# Patient Record
Sex: FEMALE | Race: WHITE | Employment: OTHER | URBAN - METROPOLITAN AREA
[De-identification: names, ages, dates, MRNs, and addresses within clinical notes are randomized per-mention and may not be internally consistent; named-entity substitution may affect disease eponyms.]

---

## 2021-07-02 DIAGNOSIS — E03.9 ACQUIRED HYPOTHYROIDISM: ICD-10-CM

## 2021-07-02 DIAGNOSIS — Z13.220 LIPID SCREENING: ICD-10-CM

## 2021-07-02 LAB
A/G RATIO: 1.8 (ref 1.1–2.2)
ALBUMIN SERPL-MCNC: 4.6 G/DL (ref 3.4–5)
ALP BLD-CCNC: 111 U/L (ref 40–129)
ALT SERPL-CCNC: <5 U/L (ref 10–40)
ANION GAP SERPL CALCULATED.3IONS-SCNC: 12 MMOL/L (ref 3–16)
AST SERPL-CCNC: 14 U/L (ref 15–37)
BASOPHILS ABSOLUTE: 0 K/UL (ref 0–0.2)
BASOPHILS RELATIVE PERCENT: 0.5 %
BILIRUB SERPL-MCNC: 0.5 MG/DL (ref 0–1)
BUN BLDV-MCNC: 15 MG/DL (ref 7–20)
CALCIUM SERPL-MCNC: 9.3 MG/DL (ref 8.3–10.6)
CHLORIDE BLD-SCNC: 108 MMOL/L (ref 99–110)
CHOLESTEROL, TOTAL: 165 MG/DL (ref 0–199)
CO2: 22 MMOL/L (ref 21–32)
CREAT SERPL-MCNC: 0.8 MG/DL (ref 0.6–1.1)
EOSINOPHILS ABSOLUTE: 0.1 K/UL (ref 0–0.6)
EOSINOPHILS RELATIVE PERCENT: 1.3 %
GFR AFRICAN AMERICAN: >60
GFR NON-AFRICAN AMERICAN: >60
GLOBULIN: 2.6 G/DL
GLUCOSE FASTING: 98 MG/DL (ref 70–99)
HCT VFR BLD CALC: 40.9 % (ref 36–48)
HDLC SERPL-MCNC: 64 MG/DL (ref 40–60)
HEMOGLOBIN: 13.9 G/DL (ref 12–16)
LDL CHOLESTEROL CALCULATED: 83 MG/DL
LYMPHOCYTES ABSOLUTE: 1.9 K/UL (ref 1–5.1)
LYMPHOCYTES RELATIVE PERCENT: 26 %
MCH RBC QN AUTO: 31.7 PG (ref 26–34)
MCHC RBC AUTO-ENTMCNC: 34 G/DL (ref 31–36)
MCV RBC AUTO: 93 FL (ref 80–100)
MONOCYTES ABSOLUTE: 0.4 K/UL (ref 0–1.3)
MONOCYTES RELATIVE PERCENT: 5.2 %
NEUTROPHILS ABSOLUTE: 5 K/UL (ref 1.7–7.7)
NEUTROPHILS RELATIVE PERCENT: 67 %
PDW BLD-RTO: 12.9 % (ref 12.4–15.4)
PLATELET # BLD: 247 K/UL (ref 135–450)
PMV BLD AUTO: 8.5 FL (ref 5–10.5)
POTASSIUM SERPL-SCNC: 4.3 MMOL/L (ref 3.5–5.1)
RBC # BLD: 4.4 M/UL (ref 4–5.2)
SODIUM BLD-SCNC: 142 MMOL/L (ref 136–145)
T4 FREE: 1.3 NG/DL (ref 0.9–1.8)
TOTAL PROTEIN: 7.2 G/DL (ref 6.4–8.2)
TRIGL SERPL-MCNC: 89 MG/DL (ref 0–150)
TSH SERPL DL<=0.05 MIU/L-ACNC: 3.47 UIU/ML (ref 0.27–4.2)
VLDLC SERPL CALC-MCNC: 18 MG/DL
WBC # BLD: 7.5 K/UL (ref 4–11)

## 2021-07-03 LAB
FOLATE: 8.72 NG/ML (ref 4.78–24.2)
VITAMIN B-12: 427 PG/ML (ref 211–911)

## 2022-11-12 PROBLEM — Z71.2 ENCOUNTER TO DISCUSS COLONOSCOPY RESULTS: Status: ACTIVE | Noted: 2022-11-12

## 2023-05-25 ENCOUNTER — OFFICE VISIT (OUTPATIENT)
Dept: SURGERY | Age: 48
End: 2023-05-25
Payer: COMMERCIAL

## 2023-05-25 VITALS
WEIGHT: 162 LBS | BODY MASS INDEX: 26.03 KG/M2 | HEIGHT: 66 IN | DIASTOLIC BLOOD PRESSURE: 76 MMHG | SYSTOLIC BLOOD PRESSURE: 118 MMHG

## 2023-05-25 DIAGNOSIS — S72.309D CLOSED FRACTURE OF SHAFT OF FEMUR WITH ROUTINE HEALING, UNSPECIFIED FRACTURE MORPHOLOGY, UNSPECIFIED LATERALITY, SUBSEQUENT ENCOUNTER: Primary | ICD-10-CM

## 2023-05-25 PROCEDURE — 99204 OFFICE O/P NEW MOD 45 MIN: CPT | Performed by: SURGERY

## 2023-05-25 PROCEDURE — G8427 DOCREV CUR MEDS BY ELIG CLIN: HCPCS | Performed by: SURGERY

## 2023-05-25 PROCEDURE — 1036F TOBACCO NON-USER: CPT | Performed by: SURGERY

## 2023-05-25 PROCEDURE — G8419 CALC BMI OUT NRM PARAM NOF/U: HCPCS | Performed by: SURGERY

## 2023-05-25 PROCEDURE — 3017F COLORECTAL CA SCREEN DOC REV: CPT | Performed by: SURGERY

## 2023-05-26 PROBLEM — L98.492 TRAUMATIC SKIN ULCER, WITH FAT LAYER EXPOSED (HCC): Status: ACTIVE | Noted: 2023-05-26

## 2023-05-26 PROBLEM — T81.89XA NONHEALING SURGICAL WOUND, INITIAL ENCOUNTER: Status: ACTIVE | Noted: 2023-05-26

## 2023-05-26 PROBLEM — L97.122 SKIN ULCER OF LEFT HIP WITH FAT LAYER EXPOSED (HCC): Status: ACTIVE | Noted: 2023-05-26

## 2023-05-26 PROBLEM — L97.322 ANKLE ULCER, LEFT, WITH FAT LAYER EXPOSED (HCC): Status: ACTIVE | Noted: 2023-05-26

## 2023-05-26 PROBLEM — Z93.3 COLOSTOMY PRESENT (HCC): Status: ACTIVE | Noted: 2023-05-26

## 2023-05-26 PROBLEM — L98.492 TRAUMATIC SKIN ULCER, WITH FAT LAYER EXPOSED (HCC): Status: RESOLVED | Noted: 2023-05-26 | Resolved: 2023-05-26

## 2023-05-26 PROBLEM — L98.492 ULCER OF ABDOMEN WALL WITH FAT LAYER EXPOSED (HCC): Status: ACTIVE | Noted: 2023-05-26

## 2023-05-26 PROBLEM — L97.822 SKIN ULCER OF LEFT KNEE WITH FAT LAYER EXPOSED (HCC): Status: ACTIVE | Noted: 2023-05-26

## 2023-05-26 ASSESSMENT — ENCOUNTER SYMPTOMS
ABDOMINAL PAIN: 1
BACK PAIN: 0
EYE ITCHING: 0
CONSTIPATION: 0
EYE REDNESS: 0
APNEA: 0
COLOR CHANGE: 0
RECTAL PAIN: 0
ANAL BLEEDING: 0
STRIDOR: 0
SORE THROAT: 0
PHOTOPHOBIA: 0
CHOKING: 0

## 2023-06-04 PROBLEM — L02.91 ABSCESS: Status: ACTIVE | Noted: 2023-06-04

## 2023-06-04 PROBLEM — K65.1 INTRA-ABDOMINAL ABSCESS (HCC): Status: ACTIVE | Noted: 2023-06-04

## 2023-06-15 ENCOUNTER — TELEPHONE (OUTPATIENT)
Dept: WOUND CARE | Age: 48
End: 2023-06-15

## 2023-06-26 ENCOUNTER — OFFICE VISIT (OUTPATIENT)
Dept: SURGERY | Age: 48
End: 2023-06-26

## 2023-06-26 ENCOUNTER — OFFICE VISIT (OUTPATIENT)
Dept: ORTHOPEDIC SURGERY | Age: 48
End: 2023-06-26
Payer: COMMERCIAL

## 2023-06-26 VITALS
DIASTOLIC BLOOD PRESSURE: 72 MMHG | BODY MASS INDEX: 22.6 KG/M2 | HEART RATE: 110 BPM | OXYGEN SATURATION: 95 % | WEIGHT: 140 LBS | SYSTOLIC BLOOD PRESSURE: 128 MMHG

## 2023-06-26 VITALS — OXYGEN SATURATION: 97 % | HEART RATE: 139 BPM | RESPIRATION RATE: 14 BRPM

## 2023-06-26 DIAGNOSIS — L02.91 ABSCESS: Primary | ICD-10-CM

## 2023-06-26 DIAGNOSIS — E03.9 ACQUIRED HYPOTHYROIDISM: ICD-10-CM

## 2023-06-26 DIAGNOSIS — Z87.81 S/P ORIF (OPEN REDUCTION INTERNAL FIXATION) FRACTURE: ICD-10-CM

## 2023-06-26 DIAGNOSIS — S72.432A CLOSED BICONDYLAR FRACTURE OF LEFT FEMUR, INITIAL ENCOUNTER (HCC): ICD-10-CM

## 2023-06-26 DIAGNOSIS — S72.422A CLOSED BICONDYLAR FRACTURE OF LEFT FEMUR, INITIAL ENCOUNTER (HCC): ICD-10-CM

## 2023-06-26 DIAGNOSIS — G43.009 NONINTRACTABLE MIGRAINE, UNSPECIFIED MIGRAINE TYPE: ICD-10-CM

## 2023-06-26 DIAGNOSIS — J44.9 CHRONIC OBSTRUCTIVE PULMONARY DISEASE, UNSPECIFIED COPD TYPE (HCC): ICD-10-CM

## 2023-06-26 DIAGNOSIS — S32.302A CLOSED FRACTURE OF LEFT ILIAC WING, INITIAL ENCOUNTER (HCC): Primary | ICD-10-CM

## 2023-06-26 DIAGNOSIS — Z98.890 S/P ORIF (OPEN REDUCTION INTERNAL FIXATION) FRACTURE: ICD-10-CM

## 2023-06-26 LAB
ALBUMIN SERPL-MCNC: 4.1 G/DL
ALP BLD-CCNC: 321 U/L
ALT SERPL-CCNC: 7 U/L
ANION GAP SERPL CALCULATED.3IONS-SCNC: 1.4 MMOL/L
AST SERPL-CCNC: 19 U/L
BASOPHILS ABSOLUTE: 0 /ΜL
BASOPHILS RELATIVE PERCENT: 0.4 %
BILIRUB SERPL-MCNC: 0.3 MG/DL (ref 0.1–1.4)
BUN BLDV-MCNC: 6 MG/DL
C-REACTIVE PROTEIN: 1.35
CALCIUM SERPL-MCNC: 10.3 MG/DL
CHLORIDE BLD-SCNC: 104 MMOL/L
CO2: 25 MMOL/L
CREAT SERPL-MCNC: 0.7 MG/DL
EGFR: 105
EOSINOPHILS ABSOLUTE: 0.3 /ΜL
EOSINOPHILS RELATIVE PERCENT: 4.6 %
GLUCOSE BLD-MCNC: 98 MG/DL
HCT VFR BLD CALC: 37.6 % (ref 36–46)
HEMOGLOBIN: 12.4 G/DL (ref 12–16)
LYMPHOCYTES ABSOLUTE: 2.1 /ΜL
LYMPHOCYTES RELATIVE PERCENT: 30.7 %
MCH RBC QN AUTO: 29.2 PG
MCHC RBC AUTO-ENTMCNC: 33 G/DL
MCV RBC AUTO: 88.7 FL
MONOCYTES ABSOLUTE: 0.4 /ΜL
MONOCYTES RELATIVE PERCENT: 5.3 %
NEUTROPHILS ABSOLUTE: 4.1 /ΜL
NEUTROPHILS RELATIVE PERCENT: 58.9 %
PDW BLD-RTO: 13.6 %
PLATELET # BLD: 304 K/ΜL
PMV BLD AUTO: 9.7 FL
POTASSIUM SERPL-SCNC: 4.1 MMOL/L
RBC # BLD: 4.24 10^6/ΜL
SEDIMENTATION RATE, ERYTHROCYTE: 56
SODIUM BLD-SCNC: 143 MMOL/L
TOTAL PROTEIN: 7
WBC # BLD: 6.9 10^3/ML

## 2023-06-26 PROCEDURE — 3017F COLORECTAL CA SCREEN DOC REV: CPT | Performed by: PHYSICIAN ASSISTANT

## 2023-06-26 PROCEDURE — 1111F DSCHRG MED/CURRENT MED MERGE: CPT | Performed by: PHYSICIAN ASSISTANT

## 2023-06-26 PROCEDURE — G8420 CALC BMI NORM PARAMETERS: HCPCS | Performed by: PHYSICIAN ASSISTANT

## 2023-06-26 PROCEDURE — 1036F TOBACCO NON-USER: CPT | Performed by: PHYSICIAN ASSISTANT

## 2023-06-26 PROCEDURE — G8427 DOCREV CUR MEDS BY ELIG CLIN: HCPCS | Performed by: PHYSICIAN ASSISTANT

## 2023-06-26 PROCEDURE — 99203 OFFICE O/P NEW LOW 30 MIN: CPT | Performed by: PHYSICIAN ASSISTANT

## 2023-06-26 RX ORDER — ALBUTEROL SULFATE 90 UG/1
1 AEROSOL, METERED RESPIRATORY (INHALATION) EVERY 6 HOURS PRN
Qty: 3 EACH | Refills: 0 | Status: SHIPPED | OUTPATIENT
Start: 2023-06-26

## 2023-06-26 RX ORDER — GABAPENTIN 600 MG/1
600 TABLET ORAL 3 TIMES DAILY
COMMUNITY
End: 2023-06-28 | Stop reason: SDUPTHER

## 2023-06-26 RX ORDER — MIDODRINE HYDROCHLORIDE 5 MG/1
5 TABLET ORAL 3 TIMES DAILY
Qty: 270 TABLET | Refills: 1 | Status: SHIPPED | OUTPATIENT
Start: 2023-06-26

## 2023-06-26 RX ORDER — ASPIRIN 81 MG/1
TABLET, CHEWABLE ORAL
Qty: 90 TABLET | Refills: 0 | Status: SHIPPED | OUTPATIENT
Start: 2023-06-26

## 2023-06-26 RX ORDER — FAMOTIDINE 20 MG/1
TABLET, FILM COATED ORAL
Qty: 180 TABLET | Refills: 0 | Status: SHIPPED | OUTPATIENT
Start: 2023-06-26

## 2023-06-26 RX ORDER — MECLIZINE HYDROCHLORIDE 25 MG/1
25 TABLET ORAL DAILY PRN
Qty: 90 TABLET | Refills: 0 | Status: SHIPPED | OUTPATIENT
Start: 2023-06-26

## 2023-06-26 RX ORDER — GABAPENTIN 100 MG/1
200 CAPSULE ORAL 4 TIMES DAILY
Qty: 720 CAPSULE | Refills: 0 | Status: SHIPPED | OUTPATIENT
Start: 2023-06-26 | End: 2023-06-28

## 2023-06-26 RX ORDER — OXYCODONE HYDROCHLORIDE AND ACETAMINOPHEN 10; 300 MG/5ML; MG/5ML
SOLUTION ORAL
COMMUNITY
Start: 2023-06-11

## 2023-06-26 RX ORDER — TOPIRAMATE 25 MG/1
75 TABLET ORAL NIGHTLY
Qty: 270 TABLET | Refills: 0 | Status: SHIPPED | OUTPATIENT
Start: 2023-06-26 | End: 2023-09-24

## 2023-06-26 RX ORDER — BUDESONIDE AND FORMOTEROL FUMARATE DIHYDRATE 160; 4.5 UG/1; UG/1
AEROSOL RESPIRATORY (INHALATION)
Qty: 3 EACH | Refills: 0 | Status: SHIPPED | OUTPATIENT
Start: 2023-06-26

## 2023-06-26 RX ORDER — LEVOTHYROXINE SODIUM 0.1 MG/1
100 TABLET ORAL DAILY
Qty: 90 TABLET | Refills: 0 | Status: SHIPPED | OUTPATIENT
Start: 2023-06-26

## 2023-06-26 ASSESSMENT — PATIENT HEALTH QUESTIONNAIRE - PHQ9
10. IF YOU CHECKED OFF ANY PROBLEMS, HOW DIFFICULT HAVE THESE PROBLEMS MADE IT FOR YOU TO DO YOUR WORK, TAKE CARE OF THINGS AT HOME, OR GET ALONG WITH OTHER PEOPLE: 1
4. FEELING TIRED OR HAVING LITTLE ENERGY: 2
SUM OF ALL RESPONSES TO PHQ9 QUESTIONS 1 & 2: 4
SUM OF ALL RESPONSES TO PHQ QUESTIONS 1-9: 12
5. POOR APPETITE OR OVEREATING: 0
SUM OF ALL RESPONSES TO PHQ QUESTIONS 1-9: 12
SUM OF ALL RESPONSES TO PHQ QUESTIONS 1-9: 12
9. THOUGHTS THAT YOU WOULD BE BETTER OFF DEAD, OR OF HURTING YOURSELF: 0
7. TROUBLE CONCENTRATING ON THINGS, SUCH AS READING THE NEWSPAPER OR WATCHING TELEVISION: 2
3. TROUBLE FALLING OR STAYING ASLEEP: 2
6. FEELING BAD ABOUT YOURSELF - OR THAT YOU ARE A FAILURE OR HAVE LET YOURSELF OR YOUR FAMILY DOWN: 2
SUM OF ALL RESPONSES TO PHQ QUESTIONS 1-9: 12
8. MOVING OR SPEAKING SO SLOWLY THAT OTHER PEOPLE COULD HAVE NOTICED. OR THE OPPOSITE, BEING SO FIGETY OR RESTLESS THAT YOU HAVE BEEN MOVING AROUND A LOT MORE THAN USUAL: 0
1. LITTLE INTEREST OR PLEASURE IN DOING THINGS: 2
2. FEELING DOWN, DEPRESSED OR HOPELESS: 2

## 2023-06-28 ENCOUNTER — TELEPHONE (OUTPATIENT)
Dept: BARIATRICS/WEIGHT MGMT | Age: 48
End: 2023-06-28

## 2023-06-28 RX ORDER — GABAPENTIN 600 MG/1
600 TABLET ORAL NIGHTLY
Qty: 90 TABLET | Refills: 0 | Status: SHIPPED | OUTPATIENT
Start: 2023-06-28 | End: 2023-09-26

## 2023-06-29 ENCOUNTER — HOSPITAL ENCOUNTER (OUTPATIENT)
Dept: WOUND CARE | Age: 48
Discharge: HOME | End: 2023-06-29
Payer: COMMERCIAL

## 2023-06-29 VITALS
DIASTOLIC BLOOD PRESSURE: 59 MMHG | HEART RATE: 80 BPM | RESPIRATION RATE: 18 BRPM | TEMPERATURE: 97.9 F | SYSTOLIC BLOOD PRESSURE: 106 MMHG

## 2023-06-29 DIAGNOSIS — T81.89XA NONHEALING SURGICAL WOUND, INITIAL ENCOUNTER: Primary | ICD-10-CM

## 2023-06-29 DIAGNOSIS — L97.322 ANKLE ULCER, LEFT, WITH FAT LAYER EXPOSED (HCC): ICD-10-CM

## 2023-06-29 DIAGNOSIS — L97.112 THIGH ULCER, RIGHT, WITH FAT LAYER EXPOSED (HCC): ICD-10-CM

## 2023-06-29 DIAGNOSIS — L97.822 SKIN ULCER OF LEFT KNEE WITH FAT LAYER EXPOSED (HCC): ICD-10-CM

## 2023-06-29 DIAGNOSIS — L97.122 SKIN ULCER OF LEFT HIP WITH FAT LAYER EXPOSED (HCC): ICD-10-CM

## 2023-06-29 DIAGNOSIS — L98.492 TRAUMATIC ULCER WITH FAT LAYER EXPOSED (HCC): ICD-10-CM

## 2023-06-29 DIAGNOSIS — L98.492 SKIN ULCER OF ABDOMEN WITH FAT LAYER EXPOSED (HCC): ICD-10-CM

## 2023-06-29 DIAGNOSIS — Z93.3 COLOSTOMY IN PLACE (HCC): ICD-10-CM

## 2023-06-29 DIAGNOSIS — L97.812 SKIN ULCER OF RIGHT KNEE WITH FAT LAYER EXPOSED (HCC): ICD-10-CM

## 2023-06-29 PROCEDURE — 99213 OFFICE O/P EST LOW 20 MIN: CPT | Performed by: NURSE PRACTITIONER

## 2023-06-29 PROCEDURE — 99212 OFFICE O/P EST SF 10 MIN: CPT

## 2023-06-29 RX ORDER — IBUPROFEN 200 MG
TABLET ORAL ONCE
Status: CANCELLED | OUTPATIENT
Start: 2023-06-29 | End: 2023-06-29

## 2023-06-29 RX ORDER — BACITRACIN ZINC AND POLYMYXIN B SULFATE 500; 1000 [USP'U]/G; [USP'U]/G
OINTMENT TOPICAL ONCE
Status: CANCELLED | OUTPATIENT
Start: 2023-06-29 | End: 2023-06-29

## 2023-06-29 RX ORDER — SODIUM CHLOR/HYPOCHLOROUS ACID 0.033 %
SOLUTION, IRRIGATION IRRIGATION ONCE
Status: CANCELLED | OUTPATIENT
Start: 2023-06-29 | End: 2023-06-29

## 2023-06-29 RX ORDER — BETAMETHASONE DIPROPIONATE 0.05 %
OINTMENT (GRAM) TOPICAL ONCE
Status: CANCELLED | OUTPATIENT
Start: 2023-06-29 | End: 2023-06-29

## 2023-06-29 RX ORDER — LIDOCAINE 40 MG/G
CREAM TOPICAL ONCE
Status: CANCELLED | OUTPATIENT
Start: 2023-06-29 | End: 2023-06-29

## 2023-06-29 RX ORDER — LIDOCAINE 50 MG/G
OINTMENT TOPICAL ONCE
Status: CANCELLED | OUTPATIENT
Start: 2023-06-29 | End: 2023-06-29

## 2023-06-29 RX ORDER — LIDOCAINE HYDROCHLORIDE 40 MG/ML
SOLUTION TOPICAL ONCE
Status: CANCELLED | OUTPATIENT
Start: 2023-06-29 | End: 2023-06-29

## 2023-06-29 RX ORDER — GINSENG 100 MG
CAPSULE ORAL ONCE
Status: CANCELLED | OUTPATIENT
Start: 2023-06-29 | End: 2023-06-29

## 2023-06-29 RX ORDER — GENTAMICIN SULFATE 1 MG/G
OINTMENT TOPICAL ONCE
Status: CANCELLED | OUTPATIENT
Start: 2023-06-29 | End: 2023-06-29

## 2023-06-29 RX ORDER — CLOBETASOL PROPIONATE 0.5 MG/G
OINTMENT TOPICAL ONCE
Status: CANCELLED | OUTPATIENT
Start: 2023-06-29 | End: 2023-06-29

## 2023-06-29 ASSESSMENT — PAIN DESCRIPTION - ORIENTATION: ORIENTATION: RIGHT

## 2023-06-29 ASSESSMENT — PAIN DESCRIPTION - DESCRIPTORS: DESCRIPTORS: ACHING

## 2023-06-29 ASSESSMENT — PAIN DESCRIPTION - LOCATION: LOCATION: ANKLE

## 2023-06-29 ASSESSMENT — PAIN SCALES - GENERAL: PAINLEVEL_OUTOF10: 7

## 2023-06-30 RX ORDER — TIZANIDINE 4 MG/1
TABLET ORAL
Qty: 90 TABLET | Refills: 0 | Status: SHIPPED | OUTPATIENT
Start: 2023-06-30

## 2023-07-02 ASSESSMENT — ENCOUNTER SYMPTOMS
APNEA: 0
EYE ITCHING: 0
STRIDOR: 0
CONSTIPATION: 0
BACK PAIN: 0
RECTAL PAIN: 0
CHOKING: 0
PHOTOPHOBIA: 0
ANAL BLEEDING: 0
EYE REDNESS: 0
SORE THROAT: 0
COLOR CHANGE: 0

## 2023-07-03 ENCOUNTER — TELEPHONE (OUTPATIENT)
Dept: INFECTIOUS DISEASES | Age: 48
End: 2023-07-03

## 2023-07-03 ENCOUNTER — OFFICE VISIT (OUTPATIENT)
Dept: SURGERY | Age: 48
End: 2023-07-03
Payer: COMMERCIAL

## 2023-07-03 VITALS
BODY MASS INDEX: 22.6 KG/M2 | DIASTOLIC BLOOD PRESSURE: 64 MMHG | HEIGHT: 66 IN | HEART RATE: 76 BPM | SYSTOLIC BLOOD PRESSURE: 116 MMHG

## 2023-07-03 DIAGNOSIS — L02.91 ABSCESS: Primary | ICD-10-CM

## 2023-07-03 LAB
ALBUMIN SERPL-MCNC: 4.2 G/DL
ALP BLD-CCNC: 290 U/L
ALT SERPL-CCNC: 7 U/L
ANION GAP SERPL CALCULATED.3IONS-SCNC: 1.4 MMOL/L
AST SERPL-CCNC: 29 U/L
BASOPHILS ABSOLUTE: 0.1 /ΜL
BASOPHILS RELATIVE PERCENT: 0.6 %
BILIRUB SERPL-MCNC: 0.3 MG/DL (ref 0.1–1.4)
BUN BLDV-MCNC: 10 MG/DL
C-REACTIVE PROTEIN: 1.05
CALCIUM SERPL-MCNC: 10.4 MG/DL
CHLORIDE BLD-SCNC: 105 MMOL/L
CO2: 25 MMOL/L
CREAT SERPL-MCNC: 0.9 MG/DL
EGFR: 78
EOSINOPHILS ABSOLUTE: 0.4 /ΜL
EOSINOPHILS RELATIVE PERCENT: 5.1 %
GLUCOSE BLD-MCNC: 83 MG/DL
HCT VFR BLD CALC: 36.7 % (ref 36–46)
HEMOGLOBIN: 12.7 G/DL (ref 12–16)
LYMPHOCYTES ABSOLUTE: 2.1 /ΜL
LYMPHOCYTES RELATIVE PERCENT: 27.4 %
MCH RBC QN AUTO: 30 PG
MCHC RBC AUTO-ENTMCNC: 34.6 G/DL
MCV RBC AUTO: 86.8 FL
MONOCYTES ABSOLUTE: 0.4 /ΜL
MONOCYTES RELATIVE PERCENT: 5.4 %
NEUTROPHILS ABSOLUTE: 4.8 /ΜL
NEUTROPHILS RELATIVE PERCENT: 61.4 %
PDW BLD-RTO: 13.7 %
PLATELET # BLD: 323 K/ΜL
PMV BLD AUTO: 9.8 FL
POTASSIUM SERPL-SCNC: 4.3 MMOL/L
RBC # BLD: 4.23 10^6/ΜL
SEDIMENTATION RATE, ERYTHROCYTE: 34
SODIUM BLD-SCNC: 143 MMOL/L
TOTAL PROTEIN: 7.1
WBC # BLD: 7.8 10^3/ML

## 2023-07-03 PROCEDURE — 1036F TOBACCO NON-USER: CPT | Performed by: PHYSICIAN ASSISTANT

## 2023-07-03 PROCEDURE — G8420 CALC BMI NORM PARAMETERS: HCPCS | Performed by: PHYSICIAN ASSISTANT

## 2023-07-03 PROCEDURE — 3017F COLORECTAL CA SCREEN DOC REV: CPT | Performed by: PHYSICIAN ASSISTANT

## 2023-07-03 PROCEDURE — G8427 DOCREV CUR MEDS BY ELIG CLIN: HCPCS | Performed by: PHYSICIAN ASSISTANT

## 2023-07-03 PROCEDURE — 1111F DSCHRG MED/CURRENT MED MERGE: CPT | Performed by: PHYSICIAN ASSISTANT

## 2023-07-03 PROCEDURE — 99213 OFFICE O/P EST LOW 20 MIN: CPT | Performed by: PHYSICIAN ASSISTANT

## 2023-07-03 NOTE — TELEPHONE ENCOUNTER
Messaged from Mima Ayala in [facility] states pt is out of their ABX on 7/6/23. Is it ok for them to extend them out till we can see the pt? They just put the referral in today 7/3/23. Please advise.

## 2023-07-05 ENCOUNTER — TELEPHONE (OUTPATIENT)
Dept: INFECTIOUS DISEASES | Age: 48
End: 2023-07-05

## 2023-07-05 ENCOUNTER — TELEPHONE (OUTPATIENT)
Dept: SURGERY | Age: 48
End: 2023-07-05

## 2023-07-05 ASSESSMENT — ENCOUNTER SYMPTOMS
BACK PAIN: 0
CHOKING: 0
PHOTOPHOBIA: 0
EYE REDNESS: 0
COLOR CHANGE: 0
NAUSEA: 0
CONSTIPATION: 0
STRIDOR: 0
ANAL BLEEDING: 0
ABDOMINAL PAIN: 1
APNEA: 0
VOMITING: 0
RECTAL PAIN: 0
EYE ITCHING: 0
SORE THROAT: 0

## 2023-07-05 NOTE — TELEPHONE ENCOUNTER
PT called regarding APPT with Dr. Elliott Harrison , spoke with office they will call after 12:00 once he reviews her records. Informed leana of this information.

## 2023-07-05 NOTE — TELEPHONE ENCOUNTER
Spoke with Maykel Armendariz at Dr. Lolly Wilson, they have patient scheduled for 7/12/23, extened her ATB, scheduled lab's , called home health regarding orders and spoke with patient regarding her instructions

## 2023-07-05 NOTE — PROGRESS NOTES
Insecurity: No Food Insecurity    Worried About Running Out of Food in the Last Year: Never true    Ran Out of Food in the Last Year: Never true   Transportation Needs: Unknown    Lack of Transportation (Medical): Not on file    Lack of Transportation (Non-Medical): No   Physical Activity: Sufficiently Active    Days of Exercise per Week: 7 days    Minutes of Exercise per Session: 50 min   Stress: Not on file   Social Connections: Not on file   Intimate Partner Violence: Not on file   Housing Stability: Unknown    Unable to Pay for Housing in the Last Year: Not on file    Number of Places Lived in the Last Year: Not on file    Unstable Housing in the Last Year: No       Current Outpatient Medications   Medication Sig Dispense Refill    tiZANidine (ZANAFLEX) 4 MG tablet TAKE 1 TABLET BY MOUTH EVERY NIGHT 90 tablet 0    gabapentin (NEURONTIN) 600 MG tablet Take 1 tablet by mouth at bedtime for 90 days.  90 tablet 0    levothyroxine (SYNTHROID) 100 MCG tablet Take 1 tablet by mouth Daily 90 tablet 0    budesonide-formoterol (SYMBICORT) 160-4.5 MCG/ACT AERO INHALE 2 PUFFS INTO THE LUNGS DAILY 3 each 0    tiotropium (SPIRIVA RESPIMAT) 2.5 MCG/ACT AERS inhaler Inhale 2 puffs into the lungs daily 3 each 0    topiramate (TOPAMAX) 25 MG tablet Take 3 tablets by mouth nightly 270 tablet 0    albuterol sulfate HFA (PROAIR HFA) 108 (90 Base) MCG/ACT inhaler Inhale 1 puff into the lungs every 6 hours as needed for Wheezing 3 each 0    famotidine (PEPCID) 20 MG tablet TAKE 1 TABLET BY MOUTH TWICE DAILY 180 tablet 0    meclizine (ANTIVERT) 25 MG tablet Take 1 tablet by mouth daily as needed for Dizziness 90 tablet 0    aspirin 81 MG chewable tablet CHEW AND SWALLOW 1 TABLET BY MOUTH DAILY 90 tablet 0    Acetaminophen (TYLENOL 8 HOUR PO) 2 tablet EVERY 6 HOURS (route: oral)      Heparin Sod, Pork, Lock Flush (HEPARIN LOCK FLUSH IV) 3 mL DAILY (route: intravenous)      oxyCODONE-Acetaminophen  MG/5ML SOLN 1 tablet EVERY 6 HOURS

## 2023-07-05 NOTE — TELEPHONE ENCOUNTER
[address] [place] [address] for 1 wk with an end date of 7/13/23. Gave verbal order to fidelity for CMP, CBC w/Diff, ESR, CRP to be done on Monday 7/10/23 and fax results to ID.   Pt has appt with Brandon Reaves on 3/03/59 @ 11 am.

## 2023-07-06 NOTE — TELEPHONE ENCOUNTER
Called [facility] and extended the ABX by 1 wk (end date 7/13/23). Advised to collect a CMP, CBC w/Diff, ESR, CRP, and to fax results to our office. Called pt and set up appt for 7/12/23 @ 11 am.  Called pt and made aware of above. Pt voiced understanding.

## 2023-07-07 ENCOUNTER — TELEPHONE (OUTPATIENT)
Dept: FAMILY MEDICINE CLINIC | Age: 48
End: 2023-07-07

## 2023-07-07 DIAGNOSIS — S82.91XS CLOSED FRACTURE OF MULTIPLE BONES OF BOTH LOWER EXTREMITIES, SEQUELA: ICD-10-CM

## 2023-07-07 DIAGNOSIS — R26.2 UNABLE TO AMBULATE: ICD-10-CM

## 2023-07-07 DIAGNOSIS — S82.92XS CLOSED FRACTURE OF MULTIPLE BONES OF BOTH LOWER EXTREMITIES, SEQUELA: ICD-10-CM

## 2023-07-07 DIAGNOSIS — T07.XXXA MULTIPLE FRACTURES: Primary | ICD-10-CM

## 2023-07-07 NOTE — TELEPHONE ENCOUNTER
Received a call from [address] [place] [address], Occupational Nurse, [facility], requesting a Drop Arm bedside commode. Stated that patient has been advised by Ortho that she is going to remain Non Weight Bearing for several more months. Please send to [address]. Any questions please call [address] [place] [address] at [phone].

## 2023-07-10 ENCOUNTER — OFFICE VISIT (OUTPATIENT)
Dept: ORTHOPEDIC SURGERY | Age: 48
End: 2023-07-10
Payer: COMMERCIAL

## 2023-07-10 VITALS — RESPIRATION RATE: 16 BRPM | HEART RATE: 62 BPM | OXYGEN SATURATION: 97 %

## 2023-07-10 DIAGNOSIS — S92.014A NONDISPLACED FRACTURE OF BODY OF RIGHT CALCANEUS, INITIAL ENCOUNTER FOR CLOSED FRACTURE: ICD-10-CM

## 2023-07-10 DIAGNOSIS — S72.432A CLOSED BICONDYLAR FRACTURE OF LEFT FEMUR, INITIAL ENCOUNTER (HCC): Primary | ICD-10-CM

## 2023-07-10 DIAGNOSIS — S32.302A CLOSED FRACTURE OF LEFT ILIAC WING, INITIAL ENCOUNTER (HCC): ICD-10-CM

## 2023-07-10 DIAGNOSIS — S92.325D NONDISPLACED FRACTURE OF SECOND METATARSAL BONE, LEFT FOOT, SUBSEQUENT ENCOUNTER FOR FRACTURE WITH ROUTINE HEALING: ICD-10-CM

## 2023-07-10 DIAGNOSIS — S72.422A CLOSED BICONDYLAR FRACTURE OF LEFT FEMUR, INITIAL ENCOUNTER (HCC): Primary | ICD-10-CM

## 2023-07-10 LAB
ALBUMIN SERPL-MCNC: 4.2 G/DL
ALP BLD-CCNC: 410 U/L
ALT SERPL-CCNC: 13 U/L
ANION GAP SERPL CALCULATED.3IONS-SCNC: 1.5 MMOL/L
AST SERPL-CCNC: 70 U/L
BASOPHILS ABSOLUTE: 0.1 /ΜL
BASOPHILS RELATIVE PERCENT: 0.7 %
BILIRUB SERPL-MCNC: 0.2 MG/DL (ref 0.1–1.4)
BUN BLDV-MCNC: 10 MG/DL
C-REACTIVE PROTEIN: 1.47
CALCIUM SERPL-MCNC: 10.1 MG/DL
CHLORIDE BLD-SCNC: 102 MMOL/L
CO2: 21 MMOL/L
CREAT SERPL-MCNC: 0.7 MG/DL
EGFR: 105
EOSINOPHILS ABSOLUTE: 0.4 /ΜL
EOSINOPHILS RELATIVE PERCENT: 4.4 %
GLUCOSE BLD-MCNC: 86 MG/DL
HCT VFR BLD CALC: 37.3 % (ref 36–46)
HEMOGLOBIN: 12.7 G/DL (ref 12–16)
LYMPHOCYTES ABSOLUTE: 2.2 /ΜL
LYMPHOCYTES RELATIVE PERCENT: 25.9 %
MCH RBC QN AUTO: 29.3 PG
MCHC RBC AUTO-ENTMCNC: 34 G/DL
MCV RBC AUTO: 85.9 FL
MONOCYTES ABSOLUTE: 0.7 /ΜL
MONOCYTES RELATIVE PERCENT: 7.5 %
NEUTROPHILS ABSOLUTE: 5.3 /ΜL
NEUTROPHILS RELATIVE PERCENT: 61.3 %
PDW BLD-RTO: 13.5 %
PLATELET # BLD: 274 K/ΜL
PMV BLD AUTO: 9.9 FL
POTASSIUM SERPL-SCNC: 3.9 MMOL/L
RBC # BLD: 4.34 10^6/ΜL
SEDIMENTATION RATE, ERYTHROCYTE: 38
SODIUM BLD-SCNC: 139 MMOL/L
TOTAL PROTEIN: 7
WBC # BLD: 8.6 10^3/ML

## 2023-07-10 PROCEDURE — 3017F COLORECTAL CA SCREEN DOC REV: CPT | Performed by: PHYSICIAN ASSISTANT

## 2023-07-10 PROCEDURE — G8420 CALC BMI NORM PARAMETERS: HCPCS | Performed by: PHYSICIAN ASSISTANT

## 2023-07-10 PROCEDURE — 99212 OFFICE O/P EST SF 10 MIN: CPT | Performed by: PHYSICIAN ASSISTANT

## 2023-07-10 PROCEDURE — 4004F PT TOBACCO SCREEN RCVD TLK: CPT | Performed by: PHYSICIAN ASSISTANT

## 2023-07-10 PROCEDURE — G8427 DOCREV CUR MEDS BY ELIG CLIN: HCPCS | Performed by: PHYSICIAN ASSISTANT

## 2023-07-10 ASSESSMENT — ENCOUNTER SYMPTOMS
GASTROINTESTINAL NEGATIVE: 1
EYES NEGATIVE: 1
RESPIRATORY NEGATIVE: 1

## 2023-07-12 ENCOUNTER — OFFICE VISIT (OUTPATIENT)
Dept: INFECTIOUS DISEASES | Age: 48
End: 2023-07-12
Payer: COMMERCIAL

## 2023-07-12 ENCOUNTER — TELEPHONE (OUTPATIENT)
Dept: FAMILY MEDICINE CLINIC | Age: 48
End: 2023-07-12

## 2023-07-12 VITALS — SYSTOLIC BLOOD PRESSURE: 158 MMHG | RESPIRATION RATE: 18 BRPM | HEART RATE: 95 BPM | DIASTOLIC BLOOD PRESSURE: 114 MMHG

## 2023-07-12 DIAGNOSIS — K65.1 INTRA-ABDOMINAL ABSCESS (HCC): Primary | ICD-10-CM

## 2023-07-12 DIAGNOSIS — Z79.2 RECEIVING INTRAVENOUS ANTIBIOTIC TREATMENT AS OUTPATIENT: ICD-10-CM

## 2023-07-12 PROCEDURE — 1036F TOBACCO NON-USER: CPT | Performed by: NURSE PRACTITIONER

## 2023-07-12 PROCEDURE — G8420 CALC BMI NORM PARAMETERS: HCPCS | Performed by: NURSE PRACTITIONER

## 2023-07-12 PROCEDURE — G8427 DOCREV CUR MEDS BY ELIG CLIN: HCPCS | Performed by: NURSE PRACTITIONER

## 2023-07-12 PROCEDURE — 3017F COLORECTAL CA SCREEN DOC REV: CPT | Performed by: NURSE PRACTITIONER

## 2023-07-12 PROCEDURE — 99213 OFFICE O/P EST LOW 20 MIN: CPT | Performed by: NURSE PRACTITIONER

## 2023-07-12 NOTE — TELEPHONE ENCOUNTER
Called and spoke with pt and she is rescheduled for 7/25 at 4pm as a vv states that she is non weight baring and afraid to be in the car. I told her I believe you was already aware of this and that I was just rescheduling her appointment to a sooner date and time.

## 2023-07-12 NOTE — TELEPHONE ENCOUNTER
We received a request for a rx for a power wheelchair and a Therapy Evaluation Order today. They are requesting they be faxed back attn: Galileo Bolden with qualifying chart notes with the DX and member demographic sheet.  The fax number is [phone]    Please advise

## 2023-07-13 PROBLEM — Z79.2 RECEIVING INTRAVENOUS ANTIBIOTIC TREATMENT AS OUTPATIENT: Status: ACTIVE | Noted: 2023-07-13

## 2023-07-13 PROBLEM — T81.43XA POSTPROCEDURAL INTRAABDOMINAL ABSCESS: Status: ACTIVE | Noted: 2023-05-01

## 2023-07-13 ASSESSMENT — ENCOUNTER SYMPTOMS
ALLERGIC/IMMUNOLOGIC NEGATIVE: 1
RESPIRATORY NEGATIVE: 1
EYES NEGATIVE: 1
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
## 2023-07-14 ENCOUNTER — HOSPITAL ENCOUNTER (OUTPATIENT)
Dept: ULTRASOUND IMAGING | Age: 48
Discharge: HOME | End: 2023-07-14
Payer: COMMERCIAL

## 2023-07-14 ENCOUNTER — HOSPITAL ENCOUNTER (OUTPATIENT)
Dept: WOMENS IMAGING | Age: 48
Discharge: HOME | End: 2023-07-14
Payer: COMMERCIAL

## 2023-07-14 DIAGNOSIS — R92.8 ABNORMAL MAMMOGRAM: ICD-10-CM

## 2023-07-14 DIAGNOSIS — N64.59 ABNORMAL BREAST FINDING: ICD-10-CM

## 2023-07-14 PROCEDURE — G0279 TOMOSYNTHESIS, MAMMO: HCPCS

## 2023-07-14 PROCEDURE — 76641 ULTRASOUND BREAST COMPLETE: CPT

## 2023-07-17 ENCOUNTER — OFFICE VISIT (OUTPATIENT)
Dept: SURGERY | Age: 48
End: 2023-07-17
Payer: COMMERCIAL

## 2023-07-17 VITALS
SYSTOLIC BLOOD PRESSURE: 110 MMHG | WEIGHT: 140 LBS | BODY MASS INDEX: 22.5 KG/M2 | DIASTOLIC BLOOD PRESSURE: 70 MMHG | HEART RATE: 82 BPM | HEIGHT: 66 IN

## 2023-07-17 DIAGNOSIS — Z43.3 ATTENTION TO COLOSTOMY (HCC): Primary | ICD-10-CM

## 2023-07-17 PROCEDURE — 1036F TOBACCO NON-USER: CPT | Performed by: SURGERY

## 2023-07-17 PROCEDURE — G8420 CALC BMI NORM PARAMETERS: HCPCS | Performed by: SURGERY

## 2023-07-17 PROCEDURE — G8427 DOCREV CUR MEDS BY ELIG CLIN: HCPCS | Performed by: SURGERY

## 2023-07-17 PROCEDURE — 3017F COLORECTAL CA SCREEN DOC REV: CPT | Performed by: SURGERY

## 2023-07-17 PROCEDURE — 99214 OFFICE O/P EST MOD 30 MIN: CPT | Performed by: SURGERY

## 2023-07-17 RX ORDER — SODIUM, POTASSIUM,MAG SULFATES 17.5-3.13G
2 SOLUTION, RECONSTITUTED, ORAL ORAL ONCE
Qty: 1 EACH | Refills: 0 | Status: SHIPPED | OUTPATIENT
Start: 2023-07-17 | End: 2023-07-17

## 2023-07-17 ASSESSMENT — ENCOUNTER SYMPTOMS
PHOTOPHOBIA: 0
RECTAL PAIN: 0
COLOR CHANGE: 0
EYE ITCHING: 0
STRIDOR: 0
CONSTIPATION: 0
EYE REDNESS: 0
ANAL BLEEDING: 0
CHOKING: 0
APNEA: 0
BACK PAIN: 0
SORE THROAT: 0

## 2023-07-20 ENCOUNTER — TELEPHONE (OUTPATIENT)
Dept: SURGERY | Age: 48
End: 2023-07-20

## 2023-07-20 NOTE — TELEPHONE ENCOUNTER
SPOKE TO  [address] ([address] [place] [address],[address]) SCHEDULED @ [facility].  NOTIFIED OF DATES, TIMES AND LOCATION    PHONE ASSESSMENT   SURGERY - 8/2/23  800  P/O - 8/10/23 @ 1015    NPO AFTER MIDNIGHT  SUPREP  1ST BOTTLE 4PM  2ND BOTTLE 4AM  HOLD BLOOD THINNERS - 81MG ASA DO NOT HOLD

## 2023-07-25 ENCOUNTER — OFFICE VISIT (OUTPATIENT)
Dept: FAMILY MEDICINE CLINIC | Age: 48
End: 2023-07-25
Payer: COMMERCIAL

## 2023-07-25 VITALS — SYSTOLIC BLOOD PRESSURE: 112 MMHG | DIASTOLIC BLOOD PRESSURE: 72 MMHG | OXYGEN SATURATION: 94 % | HEART RATE: 90 BPM

## 2023-07-25 DIAGNOSIS — T07.XXXA MULTIPLE FRACTURES: Primary | ICD-10-CM

## 2023-07-25 DIAGNOSIS — Z98.890 S/P MULTIPLE SYSTEM TRAUMA SURGERY: ICD-10-CM

## 2023-07-25 DIAGNOSIS — R26.2 UNABLE TO AMBULATE: ICD-10-CM

## 2023-07-25 DIAGNOSIS — Z93.3 S/P COLOSTOMY (HCC): ICD-10-CM

## 2023-07-25 DIAGNOSIS — F32.1 CURRENT MODERATE EPISODE OF MAJOR DEPRESSIVE DISORDER WITHOUT PRIOR EPISODE (HCC): ICD-10-CM

## 2023-07-25 PROCEDURE — G8420 CALC BMI NORM PARAMETERS: HCPCS | Performed by: FAMILY MEDICINE

## 2023-07-25 PROCEDURE — 99215 OFFICE O/P EST HI 40 MIN: CPT | Performed by: FAMILY MEDICINE

## 2023-07-25 PROCEDURE — 1036F TOBACCO NON-USER: CPT | Performed by: FAMILY MEDICINE

## 2023-07-25 PROCEDURE — G8427 DOCREV CUR MEDS BY ELIG CLIN: HCPCS | Performed by: FAMILY MEDICINE

## 2023-07-25 PROCEDURE — 3017F COLORECTAL CA SCREEN DOC REV: CPT | Performed by: FAMILY MEDICINE

## 2023-07-25 RX ORDER — GABAPENTIN 100 MG/1
200 CAPSULE ORAL 2 TIMES DAILY
COMMUNITY
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
## 2023-07-28 ENCOUNTER — TELEPHONE (OUTPATIENT)
Dept: FAMILY MEDICINE CLINIC | Age: 48
End: 2023-07-28

## 2023-07-28 NOTE — TELEPHONE ENCOUNTER
Patient called and wanted to ask the provider what it was that her physical therapist needs to sign for her. She stated that it was something for her powered chair. Please advise.

## 2023-07-31 ENCOUNTER — CARE COORDINATION (OUTPATIENT)
Dept: CARE COORDINATION | Age: 48
End: 2023-07-31

## 2023-07-31 ASSESSMENT — ENCOUNTER SYMPTOMS
WHEEZING: 0
BACK PAIN: 0
COUGH: 0
SHORTNESS OF BREATH: 0
CHEST TIGHTNESS: 0
ABDOMINAL PAIN: 0

## 2023-07-31 NOTE — CARE COORDINATION
Ambulatory Care Coordination Note  2023    Patient Current Location:  Home: [address] [place] [id number]    ACM contacted the patient by telephone. Verified name and  with patient as identifiers. Provided introduction to self, and explanation of the ACM role. ACM: Delfin Crigler, RN    Challenges to be reviewed by the provider   Additional needs identified to be addressed with provider: No  home health care-                Method of communication with provider: chart routing. Provider referral for enrollment. Patient reports that she is not doing well. Reports concern for blistered area to her foot. Patient reports that it is painful to the touch and it is bleeding. Reports that she sent a picture to her Provider today and another yesterday. Patient reports that she is considering going to the ER. ACM checked availability of same day appts. Per [address] No availability noted. Left message for MA/ PCP office to ensure awareness of patient concern. Patient reports that she was discharged from [facility]. Requesting continued services per another agency if PCP is agreeable. Medications:  med rec. Completed. Patient reports that she manages her own medications and is taking them as directed. Denies any issue with the cost of her prescriptions. Offered patient enrollment in the Remote Patient Monitoring (RPM) program for in-home monitoring: Yes, but did not enroll at this time. Discussed support needs:  patient is active with [facility]. CM is [place]. Patient is in need of a lift chair and a scooter. Patient is not happy with how long it takes for equipment to be sent. Support given. Referral to be made for [address] support. Patient does \" ok\" with the cost of groceries and utilities. Further assessment deferred to allow for PCP follow up.     Plan for next outreach:  RPM, ensure [address] support, complete enrollment    Not routed to PCP to request new [address]
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
## 2023-08-01 ENCOUNTER — ANESTHESIA EVENT (OUTPATIENT)
Dept: ENDOSCOPY | Age: 48
End: 2023-08-01
Payer: COMMERCIAL

## 2023-08-01 ENCOUNTER — CARE COORDINATION (OUTPATIENT)
Dept: CARE COORDINATION | Age: 48
End: 2023-08-01

## 2023-08-01 DIAGNOSIS — J44.9 CHRONIC OBSTRUCTIVE PULMONARY DISEASE, UNSPECIFIED COPD TYPE (HCC): Primary | ICD-10-CM

## 2023-08-01 NOTE — PROGRESS NOTES
Spoke with patient and she will arrive at 0630 at [facility] on 8/2/2023 for her procedure at 0800. Patient asked if she could take her percocet, because she takes it everyday and I told her she could take it. IV order is in epic.

## 2023-08-01 NOTE — CARE COORDINATION
Remote Patient Kit Ordering Note      Date/Time:  8/1/2023 10:59 AM      [x] [facility] confirmed patient shipping address  [x] Patient will receive package over the next 1-3 business days. Someone 21 years or older must be present to sign for UPS delivery. [x] HRS will contact patient within 24 hours, an [address] [place] [address] will call the patient directly: If the patient does not answer, HRS will follow up with the clinical team notifying them about the unsuccessful attempt to contact the patient. HRS will make three call attempts to the patient. Provide patient with [facility] Virtual install number is: [phone]. [x] LPN will contact patient once equipment is active to welcome them to the program.                                                         [x] Hours of RPM monitoring - Monday-Friday 8947-1370; encourage patient to get vitals entered by [facility] each day to have the alert addressed same day. [x][facility] mailed RPM Patient flyer to patient. All questions answered at this time. LPN made aware the RPM kit has been ordered. EMTP notified patient of RPM equipment order.

## 2023-08-01 NOTE — ANESTHESIA PRE PROCEDURE
Department of Anesthesiology  Preprocedure Note       Name:  Onelia Oneal   Age:  46 y.o.  :  1975                                          MRN:  [id number]         Date:  2023      Surgeon: David Merchant):  bC Smith MD    Procedure: Procedure(s):  COLONOSCOPY DIAGNOSTIC/STOMA    Medications prior to admission:   Prior to Admission medications    Medication Sig Start Date End Date Taking? Authorizing Provider   gabapentin (NEURONTIN) 100 MG capsule Take 2 capsules by mouth in the morning and at bedtime. Historical Provider, MD   BIOTIN PO Take by mouth    Historical Provider, MD   tiZANidine (ZANAFLEX) 4 MG tablet TAKE 1 TABLET BY MOUTH EVERY NIGHT 23   Shahida Hayes MD   gabapentin (NEURONTIN) 600 MG tablet Take 1 tablet by mouth at bedtime for 90 days.  23  Shahida Hayes MD   levothyroxine (SYNTHROID) 100 MCG tablet Take 1 tablet by mouth Daily  Patient taking differently: Take 112 mcg by mouth Daily 23   Shahida Hayes MD   budesonide-formoterol [facility]) 160-4.5 MCG/ACT AERO INHALE 2 PUFFS INTO THE LUNGS DAILY 23   Shahida Hayes MD   tiotropium (SPIRIVA RESPIMAT) 2.5 MCG/ACT AERS inhaler Inhale 2 puffs into the lungs daily 23   Shahida Hayes MD   topiramate (TOPAMAX) 25 MG tablet Take 3 tablets by mouth nightly 23  Shahida Hayes MD   albuterol sulfate HFA (PROAIR HFA) 108 (90 Base) MCG/ACT inhaler Inhale 1 puff into the lungs every 6 hours as needed for Wheezing 23   Shahida Hayes MD   famotidine (PEPCID) 20 MG tablet TAKE 1 TABLET BY MOUTH TWICE DAILY 23   Shahida Hayes MD   meclizine (ANTIVERT) 25 MG tablet Take 1 tablet by mouth daily as needed for Dizziness 23   Shahida Hayes MD   aspirin 81 MG chewable tablet CHEW AND SWALLOW 1 TABLET BY MOUTH DAILY 23   Shahida Hayes MD   Acetaminophen (TYLENOL 8 HOUR PO) 2 tablet EVERY 6 HOURS (route: oral) 23   Historical Provider, MD   oxyCODONE-Acetaminophen  MG/5ML SOLN 1

## 2023-08-01 NOTE — PROGRESS NOTES
Remote Patient Monitoring Treatment Plan    Received request from ACM/NILSON Abrams RN  to order remote patient monitoring for in home monitoring of COPD and order completed. Patient will be monitoring blood pressure   pulse ox   survey questions. Please note: ACM has stated no scale is needed. Pt will not require weight monitoring via RPM.      Patient will engage in Remote Patient Monitoring each day to develop the skills necessary for self management. RPM Care Team Responsibilities:   Alerts will be reviewed daily and addressed within 2-4 hours during operational hours (Monday -Friday 9 am-4 pm)  Alert response and intervention documented in patient medical record  Alert response escalated to PCP per protocol and documented in patient medical record  Patient monitored over approximately  days  Discharge from program based on self-management readiness    See care coordination encounters for additional details.

## 2023-08-01 NOTE — CARE COORDINATION
Follow up contact from patient with [facility] present. Agency is requesting both orders for RN to follow with separate order for wound culture if MD feels that it is appropriate. Message left on MA line in r/t above.
Current Housing: Private Residence        Per the Fall Risk Screening, did the patient have 2 or more falls or 1 fall with injury in the past year?: Yes  Use of a Mobility Aid: Yes  Difficulty walking/impaired gait: Yes  Issues with feet or shoes like numbness, edema, shoes not fitting: Yes  Changes in vision, poor vision or poor lighting in environment: No  Dizziness: No  Other Fall Risk: No     Frequent urination at night?: Yes     Are you experiencing loss of meaning?: No  Are you experiencing loss of hope and peace?: No     Suggested Interventions and Community Resources                  Future Appointments   Date Time Provider [address]   8/7/2023 11:00 AM Hanna Pace [facility]   8/10/2023 10:15 AM Ej Ocampo MD [address] [place] [address] 1 [facility]   9/20/2023 10:00 AM Annie Lawrence MD [facility]   11/9/2023  3:40 PM Segundo Magdaleno MD [facility]     ,   COPD Assessment              Symptoms:          , and   General Assessment    Do you have any symptoms that are causing concern?: Yes  Progression since Onset: Unchanged  Reported Symptoms: Pain

## 2023-08-02 ENCOUNTER — CARE COORDINATION (OUTPATIENT)
Dept: CARE COORDINATION | Age: 48
End: 2023-08-02

## 2023-08-02 ENCOUNTER — TELEPHONE (OUTPATIENT)
Dept: FAMILY MEDICINE CLINIC | Age: 48
End: 2023-08-02

## 2023-08-02 ENCOUNTER — HOSPITAL ENCOUNTER (OUTPATIENT)
Age: 48
Setting detail: OUTPATIENT SURGERY
Discharge: HOME | End: 2023-08-02
Attending: SURGERY | Admitting: SURGERY
Payer: COMMERCIAL

## 2023-08-02 ENCOUNTER — ANESTHESIA (OUTPATIENT)
Dept: ENDOSCOPY | Age: 48
End: 2023-08-02
Payer: COMMERCIAL

## 2023-08-02 VITALS
BODY MASS INDEX: 24.11 KG/M2 | OXYGEN SATURATION: 95 % | WEIGHT: 150 LBS | HEIGHT: 66 IN | HEART RATE: 97 BPM | SYSTOLIC BLOOD PRESSURE: 123 MMHG | DIASTOLIC BLOOD PRESSURE: 83 MMHG | RESPIRATION RATE: 16 BRPM | TEMPERATURE: 97 F

## 2023-08-02 DIAGNOSIS — L76.82 POSTOPERATIVE SURGICAL COMPLICATION INVOLVING SKIN ASSOCIATED WITH DERMATOLOGIC PROCEDURE, UNSPECIFIED COMPLICATION: ICD-10-CM

## 2023-08-02 DIAGNOSIS — S81.801A WOUND OF RIGHT LOWER EXTREMITY, INITIAL ENCOUNTER: Primary | ICD-10-CM

## 2023-08-02 PROBLEM — Z43.3 ATTENTION TO COLOSTOMY (HCC): Status: ACTIVE | Noted: 2023-08-02

## 2023-08-02 PROCEDURE — 3609009600 HC COLONOSCOPY STOMA DX INCLUDING COLLJ SPEC SPX: Performed by: SURGERY

## 2023-08-02 PROCEDURE — 7100000011 HC PHASE II RECOVERY - ADDTL 15 MIN: Performed by: SURGERY

## 2023-08-02 PROCEDURE — 7100000010 HC PHASE II RECOVERY - FIRST 15 MIN: Performed by: SURGERY

## 2023-08-02 PROCEDURE — 3700000001 HC ADD 15 MINUTES (ANESTHESIA): Performed by: SURGERY

## 2023-08-02 PROCEDURE — 6360000002 HC RX W HCPCS: Performed by: NURSE ANESTHETIST, CERTIFIED REGISTERED

## 2023-08-02 PROCEDURE — 2580000003 HC RX 258: Performed by: ANESTHESIOLOGY

## 2023-08-02 PROCEDURE — 3700000000 HC ANESTHESIA ATTENDED CARE: Performed by: SURGERY

## 2023-08-02 PROCEDURE — 2709999900 HC NON-CHARGEABLE SUPPLY: Performed by: SURGERY

## 2023-08-02 RX ORDER — LIDOCAINE HYDROCHLORIDE 20 MG/ML
INJECTION, SOLUTION INTRAVENOUS PRN
Status: DISCONTINUED | OUTPATIENT
Start: 2023-08-02 | End: 2023-08-02 | Stop reason: SDUPTHER

## 2023-08-02 RX ORDER — SODIUM CHLORIDE, SODIUM LACTATE, POTASSIUM CHLORIDE, CALCIUM CHLORIDE 600; 310; 30; 20 MG/100ML; MG/100ML; MG/100ML; MG/100ML
INJECTION, SOLUTION INTRAVENOUS CONTINUOUS
Status: DISCONTINUED | OUTPATIENT
Start: 2023-08-02 | End: 2023-08-02 | Stop reason: HOSPADM

## 2023-08-02 RX ORDER — PROPOFOL 10 MG/ML
INJECTION, EMULSION INTRAVENOUS PRN
Status: DISCONTINUED | OUTPATIENT
Start: 2023-08-02 | End: 2023-08-02 | Stop reason: SDUPTHER

## 2023-08-02 RX ADMIN — LIDOCAINE HYDROCHLORIDE 100 MG: 20 INJECTION, SOLUTION INTRAVENOUS at 08:24

## 2023-08-02 RX ADMIN — PROPOFOL 230 MG: 10 INJECTION, EMULSION INTRAVENOUS at 08:24

## 2023-08-02 RX ADMIN — SODIUM CHLORIDE, POTASSIUM CHLORIDE, SODIUM LACTATE AND CALCIUM CHLORIDE: 600; 310; 30; 20 INJECTION, SOLUTION INTRAVENOUS at 07:18

## 2023-08-02 ASSESSMENT — ENCOUNTER SYMPTOMS: SHORTNESS OF BREATH: 1

## 2023-08-02 ASSESSMENT — PAIN SCALES - GENERAL
PAINLEVEL_OUTOF10: 2
PAINLEVEL_OUTOF10: 2

## 2023-08-02 ASSESSMENT — PAIN - FUNCTIONAL ASSESSMENT
PAIN_FUNCTIONAL_ASSESSMENT: PREVENTS OR INTERFERES SOME ACTIVE ACTIVITIES AND ADLS
PAIN_FUNCTIONAL_ASSESSMENT: 0-10

## 2023-08-02 ASSESSMENT — PAIN DESCRIPTION - DESCRIPTORS: DESCRIPTORS: ACHING

## 2023-08-02 ASSESSMENT — PAIN DESCRIPTION - LOCATION: LOCATION: BACK

## 2023-08-02 NOTE — CARE COORDINATION
Spoke with patient for ACM follow up. Patient reports that she is about the same. Reports that ankle is still painful. Dicussed recommendation for [facility] appointment [facility]. Patient is agreeable to [facility] scheduling same day follow up. Spoke with Stephenie/ RAJESH. Appointment scheduled for today at 2:30 pm.    Follow up contact with patient. Confirmed appointment date and time. Patient reports that she is tired and would prefer VV. No longer wants in person appt. ACM to cancel. Provided contact information for the [facility]. Encouraged patient to reach out to schedule appointment when available.
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
## 2023-08-02 NOTE — PROGRESS NOTES
0740: Pt alert and oriented x 4. Pre-op questions asked and answered appropriately by pt. Name, , armband verified, procedure, allergies, implants, prep status, OB status, last PO intake, history, meds.

## 2023-08-02 NOTE — PROGRESS NOTES
851 pt recd from [facility] to \[facility]\"". Head of bed up.  [employer] provided  857 notified  deanna of discharge, discharge instructions reviewed, verbalized understanding  [phone] pt denies needs

## 2023-08-02 NOTE — ANESTHESIA POSTPROCEDURE EVALUATION
Department of Anesthesiology  Postprocedure Note    Patient: Turner Lomax  MRN: [id number]  [address] [address] 1975  Date of evaluation: 8/2/2023      Procedure Summary     Date: 08/02/23 Room / Location: [address] [place] [address]    Anesthesia Start: 8633 Anesthesia Stop: 2787    Procedure: COLONOSCOPY DIAGNOSTIC/STOMA Diagnosis:       Attention to colostomy [facility])      (Attention to colostomy ([address]) [Z43.3])    Surgeons: Abisai Pelaez MD Responsible Provider: Kiya Lucia MD    Anesthesia Type: MAC ASA Status: 4          Anesthesia Type: No value filed.     Sabi Phase I: Sabi Score: 10    Sabi Phase II:  10      Anesthesia Post Evaluation    Patient location during evaluation: bedside  Patient participation: complete - patient participated  Level of consciousness: awake and alert  Pain score: 0  Airway patency: patent  Nausea & Vomiting: no nausea and no vomiting  Complications: no  Cardiovascular status: hemodynamically stable  Respiratory status: acceptable, room air, spontaneous ventilation and nonlabored ventilation  Hydration status: euvolemic  Pain management: adequate

## 2023-08-02 NOTE — TELEPHONE ENCOUNTER
Per [place] care coordinator:  Patient reports that she is not doing well. Reports concern for blistered area to her foot. Patient reports that it is painful to the touch and it is bleeding. Reports that she sent a picture to her Provider today and another yesterday. Follow up contact from patient with [facility] present. Agency is requesting both orders for RN to follow with separate order for wound culture if MD feels that it is appropriate.

## 2023-08-04 ENCOUNTER — CARE COORDINATION (OUTPATIENT)
Dept: CARE COORDINATION | Age: 48
End: 2023-08-04

## 2023-08-04 NOTE — TELEPHONE ENCOUNTER
Please contact [facility] and ask them to assess her knee wound that has reopened thanks he already has an open case with them and Rigo is assisting on case as well.

## 2023-08-04 NOTE — CARE COORDINATION
Collaborated with Office Jolly Page in r/t follow up to this point. Message received from Provider to ensure awareness of new [facility]. Spoke with [facility] to inform of plan for new order. Orders faxed to [facility] as per advisement. [phone]. Spoke with patient briefly. Reports that she is about the same. Patient is pleasant; but not engaged with ACM. Denies needs. Informed of new orders for [facility] to [facility] for wound care management.

## 2023-08-07 ENCOUNTER — OFFICE VISIT (OUTPATIENT)
Dept: ORTHOPEDIC SURGERY | Age: 48
End: 2023-08-07
Payer: COMMERCIAL

## 2023-08-07 VITALS
HEIGHT: 66 IN | HEART RATE: 82 BPM | RESPIRATION RATE: 16 BRPM | WEIGHT: 150 LBS | BODY MASS INDEX: 24.11 KG/M2 | OXYGEN SATURATION: 97 %

## 2023-08-07 DIAGNOSIS — S72.422A CLOSED BICONDYLAR FRACTURE OF LEFT FEMUR, INITIAL ENCOUNTER (HCC): Primary | ICD-10-CM

## 2023-08-07 DIAGNOSIS — S72.432A CLOSED BICONDYLAR FRACTURE OF LEFT FEMUR, INITIAL ENCOUNTER (HCC): Primary | ICD-10-CM

## 2023-08-07 DIAGNOSIS — S82.851S TRIMALLEOLAR FRACTURE OF ANKLE, CLOSED, RIGHT, SEQUELA: ICD-10-CM

## 2023-08-07 DIAGNOSIS — S92.014A NONDISPLACED FRACTURE OF BODY OF RIGHT CALCANEUS, INITIAL ENCOUNTER FOR CLOSED FRACTURE: ICD-10-CM

## 2023-08-07 PROCEDURE — 3017F COLORECTAL CA SCREEN DOC REV: CPT | Performed by: PHYSICIAN ASSISTANT

## 2023-08-07 PROCEDURE — 4004F PT TOBACCO SCREEN RCVD TLK: CPT | Performed by: PHYSICIAN ASSISTANT

## 2023-08-07 PROCEDURE — G8420 CALC BMI NORM PARAMETERS: HCPCS | Performed by: PHYSICIAN ASSISTANT

## 2023-08-07 PROCEDURE — 99212 OFFICE O/P EST SF 10 MIN: CPT | Performed by: PHYSICIAN ASSISTANT

## 2023-08-07 PROCEDURE — G8427 DOCREV CUR MEDS BY ELIG CLIN: HCPCS | Performed by: PHYSICIAN ASSISTANT
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
## 2023-08-07 NOTE — PATIENT INSTRUCTIONS
2 Months  Patient may benefit for a power chair for long distances  Continue to weight bear as tolerated  Continue range of motion  Ice and elevate as needed  Tylenol or Motrin for pain  Follow up as needed    We are committed to providing you the best care possible. If you receive a survey after visiting one of our offices, please take time to share your experience concerning your physician office visit. These surveys are confidential and no health information about you is shared. We are eager to improve for you and we are counting on your feedback to help make that happen.

## 2023-08-10 ENCOUNTER — OFFICE VISIT (OUTPATIENT)
Dept: SURGERY | Age: 48
End: 2023-08-10

## 2023-08-10 ENCOUNTER — CARE COORDINATION (OUTPATIENT)
Dept: CARE COORDINATION | Age: 48
End: 2023-08-10

## 2023-08-10 VITALS
BODY MASS INDEX: 24.21 KG/M2 | SYSTOLIC BLOOD PRESSURE: 118 MMHG | DIASTOLIC BLOOD PRESSURE: 70 MMHG | HEART RATE: 81 BPM | WEIGHT: 150 LBS | OXYGEN SATURATION: 98 %

## 2023-08-10 DIAGNOSIS — Z43.3 ATTENTION TO COLOSTOMY (HCC): Primary | ICD-10-CM

## 2023-08-10 ASSESSMENT — ENCOUNTER SYMPTOMS
STRIDOR: 0
COLOR CHANGE: 0
ANAL BLEEDING: 0
CHOKING: 0
PHOTOPHOBIA: 0
CONSTIPATION: 0
RECTAL PAIN: 0
EYE REDNESS: 0
SORE THROAT: 0
BACK PAIN: 0
APNEA: 0
EYE ITCHING: 0

## 2023-08-10 ASSESSMENT — PATIENT HEALTH QUESTIONNAIRE - PHQ9
SUM OF ALL RESPONSES TO PHQ QUESTIONS 1-9: 8
1. LITTLE INTEREST OR PLEASURE IN DOING THINGS: 2
SUM OF ALL RESPONSES TO PHQ QUESTIONS 1-9: 8
2. FEELING DOWN, DEPRESSED OR HOPELESS: 1
SUM OF ALL RESPONSES TO PHQ QUESTIONS 1-9: 8
SUM OF ALL RESPONSES TO PHQ QUESTIONS 1-9: 8
7. TROUBLE CONCENTRATING ON THINGS, SUCH AS READING THE NEWSPAPER OR WATCHING TELEVISION: 1
9. THOUGHTS THAT YOU WOULD BE BETTER OFF DEAD, OR OF HURTING YOURSELF: 0
4. FEELING TIRED OR HAVING LITTLE ENERGY: 1
8. MOVING OR SPEAKING SO SLOWLY THAT OTHER PEOPLE COULD HAVE NOTICED. OR THE OPPOSITE, BEING SO FIGETY OR RESTLESS THAT YOU HAVE BEEN MOVING AROUND A LOT MORE THAN USUAL: 0
5. POOR APPETITE OR OVEREATING: 1
6. FEELING BAD ABOUT YOURSELF - OR THAT YOU ARE A FAILURE OR HAVE LET YOURSELF OR YOUR FAMILY DOWN: 1
3. TROUBLE FALLING OR STAYING ASLEEP: 1
SUM OF ALL RESPONSES TO PHQ9 QUESTIONS 1 & 2: 3

## 2023-08-10 NOTE — PROGRESS NOTES
tablet 0    albuterol sulfate HFA (PROAIR HFA) 108 (90 Base) MCG/ACT inhaler Inhale 1 puff into the lungs every 6 hours as needed for Wheezing 3 each 0    famotidine (PEPCID) 20 MG tablet TAKE 1 TABLET BY MOUTH TWICE DAILY 180 tablet 0    meclizine (ANTIVERT) 25 MG tablet Take 1 tablet by mouth daily as needed for Dizziness 90 tablet 0    aspirin 81 MG chewable tablet CHEW AND SWALLOW 1 TABLET BY MOUTH DAILY 90 tablet 0    Acetaminophen (TYLENOL 8 HOUR PO) 2 tablet EVERY 6 HOURS (route: oral)      oxyCODONE-Acetaminophen  MG/5ML SOLN 1 tablet EVERY 6 HOURS (route: oral)      metoprolol tartrate (LOPRESSOR) 25 MG tablet Take 1 tablet by mouth 2 times daily 180 tablet 1    midodrine (PROAMATINE) 5 MG tablet Take 1 tablet by mouth 3 times daily 270 tablet 1    UNABLE TO FIND Dagmra lift 1 Device 0    UNABLE TO FIND 30 inches slide board 1 Device 0    UNABLE TO FIND Electric hospital bed 1 Device 0    UNABLE TO FIND Toilet commode chair 1 Device 0    UNABLE TO FIND Wheel chair 1 Device 0    UNABLE TO FIND Lift chair 1 Device 0    UNABLE TO FIND Shower chair 1 Device 0    UNABLE TO FIND Walker with seat 1 Device 0    Fluticasone furoate-vilanterol (BREO ELLIPTA) 200-25 MCG/INH AEPB inhaler Inhale 1 puff into the lungs daily 1 each 3    Blood Pressure Monitoring (BLOOD PRESSURE CUFF) MISC Daily 1 each 0    UNABLE TO FIND cane (Patient not taking: Reported on 7/31/2023) 1 Device 0     No current facility-administered medications for this visit. No Known Allergies    Review of Systems:       Review of Systems   Constitutional:  Negative for chills and fever. HENT:  Negative for ear pain, mouth sores, sore throat and tinnitus. Eyes:  Negative for photophobia, redness and itching. Respiratory:  Negative for apnea, choking and stridor. Cardiovascular:  Negative for chest pain and palpitations. Gastrointestinal:  Negative for anal bleeding, constipation and rectal pain.    Endocrine: Negative for

## 2023-08-10 NOTE — CARE COORDINATION
Spoke with patient briefly. Attempted welcome call. Patient is currently at surgeon appointment. Requests contact later today. Remote Patient Monitoring Welcome Note  Date/Time:  8/10/2023 12:19 PM  Patient Current Location: Home: [address] [id number]  Verified patients name and  as identifiers. Completed and confirmed the following:   Emergency Contact: Sunil Martin  [x] Patient received all RPM equipment (tablet, scale, blood pressure device and cuff, and pulse oximeter)  Cuff Size: regular (9.05\"-15.74\")    Weight Scale:   none                     [x] Instructed patient keep box for use when returning equipment                                                          [x] Reviewed Patient Welcome Letter with patient                         [x] Reviewed expectations for patient and care team  Monitoring hours M-F 9-4pm  Completing monitoring by 12pm on  so that alerts can be responded to in the same day  Patient weighs self at same time every day (or after urinating and waking up)  Take blood pressure 1-2 hrs after medications   RPM team may have different phone area code (including [place], [facility],[place] [address], [address] or [address])                              [] Instructed patient to keep scale on flat surface                                                         [x] Instructed patient to keep tablet plugged in at all times                      [x] Instructed how to contact IT support ([phone])  [x] Provided Remote Patient Monitoring care  information               All questions answered at this time. Patient confirms Provider follow up today. Denies any questions. Patient reports that she has not heard anything from [facility] agency in r/t RN visit. Informed of plan for ACM follow up. Patient reports that she has been trying to obtain Lift Chair per [employer]. Reports that is \" taking a long time\". Requesting assistance. Attempted to explain process to temper expectation.

## 2023-08-14 ENCOUNTER — CARE COORDINATION (OUTPATIENT)
Dept: CARE COORDINATION | Age: 48
End: 2023-08-14

## 2023-08-14 ENCOUNTER — TELEPHONE (OUTPATIENT)
Dept: SURGERY | Age: 48
End: 2023-08-14

## 2023-08-14 NOTE — TELEPHONE ENCOUNTER
LEFT MESSAGE FOR Adriane Armando REGARDING SURGERY (MARINO COLOSTOMY REVERSAL. POSS OPEN) SCHEDULED @ [facility].  NOTIFIED OF DATES, TIMES AND LOCATION    PHONE ASSESSMENT   SURGERY - 9/5/23 @ 930  P/O - 9/18/23 @ 1000    NPO AFTER MIDNIGHT    HOLD BLOOD THINNERS - 81MG ASA DO NOT HOLD

## 2023-08-14 NOTE — CARE COORDINATION
Date of referral: 8/10/23  Referral received from: [address]  Reason for referral: assist with process of lift chair which has been [facility] [employer] [facility]    Attempted phone call to Pt to complete initial assessment. No answer, voicemail message left requesting return call, contact number provided.      GIOVANNI plan of care: SW will make next outreach attempt to complete initial assessment on 8/17

## 2023-08-17 ENCOUNTER — TELEPHONE (OUTPATIENT)
Dept: SURGERY | Age: 48
End: 2023-08-17

## 2023-08-17 ENCOUNTER — CARE COORDINATION (OUTPATIENT)
Dept: CARE COORDINATION | Age: 48
End: 2023-08-17

## 2023-08-17 NOTE — TELEPHONE ENCOUNTER
Christiane Rue: [id number]    Auth Status: APPROVE  Christiane Rue: [id number]  Service: Inpatient Services (S&P)  Provider of Service(s): [facility]  Diagnosis Code(s): Z43.3Explanation:   Auth Type: OUTPATIENT  From Date: 09/05/2023  To Date: 09/06/2023  Procedure Code(s):  83110  Notes & Attachments:   View Notes & Attachments    Line Item Service Type Start Date End Date Units Required Units Approved Servicing Provider Location Status Medical Necessity  1 Inpatient Services (S&P) 09/05/2023 09/06/2023 1 1 [facility] Unspecified APPROVE Met as requested

## 2023-08-18 ENCOUNTER — CARE COORDINATION (OUTPATIENT)
Dept: CARE COORDINATION | Age: 48
End: 2023-08-18

## 2023-08-18 NOTE — CARE COORDINATION
Patient Current Location: Home: [address] [facility] [address]     Phone call to Pt to complete initial assessment. Pt was in agreement with completing assessment and with ongoing support. Initial Contact Social Work Note - Ambulatory  8/18/2023      Date of referral: 8/10/23  Referral received from: [address]  Reason for referral: assist with process of lift chair which has been requested through [employer] / [employer]    Previous SW referral: No  If yes, brief summary of outcome: n/a    Two Identifiers Verified: Yes    Insurance Provider: [address] [place] [address] St:   unk    [place] Status:  n/a    Community Providers: Local Agency on Aging, SNAP/Food [place], and Medicaid Long-Term Care    ADL Assistance Needed:  unk    Housing/Living Concerns or Home Modification Needs: unk     Transportation Concern: Pt reported she has transportation    Medication Cost Concern: none reported     Medication Adherence Concern: n/a    Financial Concern(s): Pt identified that she is ok with food and utilities for now. Pt denied receiving any assistance with utilities. SW offered to send an application for PIPP, but Pt declined. Income (only if applicable): n/a    Ability to Read/Write: Yes    Advance Care Plan:  N/A    Other: lift chair and motorized - needs a ramp    Identified Needs:  Lift chair, motorized w/c and ramp      Social Work Plan:  SW will follow up with assigned Jacky Goldman CM to identify any assistance this SW can offer with process of obtaining w/c, lift chair or ramp  Next Steps: SW will await return call from [address] SW will make next outreach attempt to [facility] on 8/24 if no response by then.      Method of Communication With Provider (if appropriate): Chart Routing       Goals Addressed                      This Visit's Progress      Patient Stated (pt-stated)         Pt reported she is waiting on lift chair, motorized w/c and ramp    Barriers: lack of support, overwhelmed by

## 2023-08-21 ENCOUNTER — TELEMEDICINE (OUTPATIENT)
Dept: FAMILY MEDICINE CLINIC | Age: 48
End: 2023-08-21
Payer: COMMERCIAL

## 2023-08-21 ENCOUNTER — CARE COORDINATION (OUTPATIENT)
Dept: CARE COORDINATION | Age: 48
End: 2023-08-21

## 2023-08-21 DIAGNOSIS — Z98.890 S/P MULTIPLE SYSTEM TRAUMA SURGERY: ICD-10-CM

## 2023-08-21 DIAGNOSIS — S81.801A WOUND OF RIGHT LOWER EXTREMITY, INITIAL ENCOUNTER: Primary | ICD-10-CM

## 2023-08-21 DIAGNOSIS — Z93.3 S/P COLOSTOMY (HCC): ICD-10-CM

## 2023-08-21 DIAGNOSIS — R26.2 UNABLE TO AMBULATE: ICD-10-CM

## 2023-08-21 DIAGNOSIS — J44.9 CHRONIC OBSTRUCTIVE PULMONARY DISEASE, UNSPECIFIED COPD TYPE (HCC): ICD-10-CM

## 2023-08-21 DIAGNOSIS — S12.9XXS PSEUDOARTHROSIS OF CERVICAL SPINE, SEQUELA: ICD-10-CM

## 2023-08-21 DIAGNOSIS — T07.XXXA MULTIPLE FRACTURES: ICD-10-CM

## 2023-08-21 DIAGNOSIS — S82.92XS CLOSED FRACTURE OF MULTIPLE BONES OF BOTH LOWER EXTREMITIES, SEQUELA: ICD-10-CM

## 2023-08-21 DIAGNOSIS — F32.1 CURRENT MODERATE EPISODE OF MAJOR DEPRESSIVE DISORDER WITHOUT PRIOR EPISODE (HCC): ICD-10-CM

## 2023-08-21 DIAGNOSIS — L76.82 POSTOPERATIVE SURGICAL COMPLICATION INVOLVING SKIN ASSOCIATED WITH DERMATOLOGIC PROCEDURE, UNSPECIFIED COMPLICATION: ICD-10-CM
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
## 2023-08-21 DIAGNOSIS — S82.91XS CLOSED FRACTURE OF MULTIPLE BONES OF BOTH LOWER EXTREMITIES, SEQUELA: ICD-10-CM

## 2023-08-21 PROCEDURE — 3017F COLORECTAL CA SCREEN DOC REV: CPT | Performed by: FAMILY MEDICINE

## 2023-08-21 PROCEDURE — 99214 OFFICE O/P EST MOD 30 MIN: CPT | Performed by: FAMILY MEDICINE

## 2023-08-21 PROCEDURE — G8428 CUR MEDS NOT DOCUMENT: HCPCS | Performed by: FAMILY MEDICINE

## 2023-08-21 ASSESSMENT — ENCOUNTER SYMPTOMS
COUGH: 0
SHORTNESS OF BREATH: 0
BACK PAIN: 0
CHEST TIGHTNESS: 0
ABDOMINAL PAIN: 0
WHEEZING: 0

## 2023-08-21 NOTE — CARE COORDINATION
Spoke with [facility] staff member. Message left for PT with request for clarification of wheelchair of choice. ACM/ PCP office numbers provided with request for return call. Ambulatory Care Coordination Note  2023    Patient Current Location:  Home: [address] [id number]     ACM contacted the patient by telephone. Verified name and  with patient as identifiers. Provided introduction to self, and explanation of the ACM role. Challenges to be reviewed by the provider   Additional needs identified to be addressed with provider: No  none               Method of communication with provider: none. ACM: Manjit Dumas RN        RPM metrics reviewed. Encouraged continued monitoring. Patient reports that she is doing ok. Reports that she was scheduled for [facility] [address] RN visit today between 1 and 2. Patient reports that she is not happy with [facility]  [address] [facility] services; but declines follow up with agency to allow for service recovery. Encouraged patient follow through. Instructed on contact with agency to leave message for therapist in r/t Provider request to clarify type of w/c from which patient would best benefit. Discussed support needs:  Patient has been \" waiting since May\" for lift chair. [employer] CM and LSW are working to assist patient with this need. Explained that this process can be complex and can take a while for completion. Support given. Offered patient enrollment in the Remote Patient Monitoring (RPM) program for in-home monitoring: Yes, patient already enrolled. Patient denies any questions or concerns at this time. ACM contact information provided should needs arise.     Plan for next outreach:  RPM review    Lab Results       None            Care Coordination Interventions    Referral from Primary Care Provider: Yes  Suggested Interventions and Community Resources          Goals Addressed                      This Visit's Progress      Conditions and Symptoms   No

## 2023-08-21 NOTE — PROGRESS NOTES
2023    TELEHEALTH EVALUATION -- Audio and Visual Communication    TELEHEALTH services provided via [address] [place] [address] application for this patient. This is an ESTABLISHED PATIENT with [facility]. Time Call began: 1:11 PM  Time Call ended: 1:23 PM    Jacek Dunn, was evaluated through a synchronous (real-time) audio-video encounter. The patient (or guardian if applicable) is aware that this is a billable service, which includes applicable co-pays. This Virtual Visit was conducted with patient's (and/or legal guardian's) consent. Patient identification was verified, and a caregiver was present when appropriate. The patient was located at Home: [address] [place] [address] [address] [facility]  Provider was located at [facility] ([address] [facility]): [address]  [address] [facility],  [address] [facility]    Not seeing wound clinic, but nurse ([facility]) supposed to come today. No longer on IVATB. PT (none x 3 weeks) and OT (none x 2 w) now done, still supposed to see wound nurse at home she hasn't come. Ortho is seeing her, she is doing home exercise sheets they had provided. Still tiny open wound medial right ankle in healed incision, ortho told her to leave off boot, he though it might be causing it. Scheduled for surgery on bowel on 23 but she is afraid to proceed as it is very slow to heal and now it is a month old. Asked her to send a current picture, we will forward to Dr. Alicia Garrido. Todays visit is for multiple DME requests, some have been sent already, we are in receipt of a request from Will Son RN at [facility]. All orders will be signed and sent today. Patient is in need of a power wheelchair to improve mobility and ADL completion within the home.     Chief Complaint(s)     Jacek Dunn (:  1975) has requested an audio/video evaluation for the following concern(s):    Chief Complaint   Patient presents with    Wound

## 2023-08-24 ENCOUNTER — CARE COORDINATION (OUTPATIENT)
Dept: CARE COORDINATION | Age: 48
End: 2023-08-24

## 2023-08-24 NOTE — CARE COORDINATION
SW conducted chart review and identified that Pt has VV with PCP for orders for DME. Attempted phone call to Pt's Ken Briceño to determine if any assistance is needed with obtaining needed DME. No answer, voicemail message left requesting return call, contact number provided.      GIOVANNI plan of care: GIOVANNI will make next outreach attempt on 9/1

## 2023-08-24 NOTE — CARE COORDINATION
Message received from patient after ACM hours. Ambulatory Care Coordination Note  2023    Patient Current Location:  Home: [address] [address] [facility] [address]     ACM contacted the patient by telephone. Verified name and  with patient as identifiers. Provided introduction to self, and explanation of the ACM role. Challenges to be reviewed by the provider   Additional needs identified to be addressed with provider: No  none               Method of communication with provider: none. ACM: Cintia Babb RN        Spoke with patient. Patient reports that her daughter requested that [address] [place] [address] RN visit patient for wound care follow up. Patient did not contact the agency with request.  Patient requests that ACM reach out to request RN visit. Patient with multiple c/o in r/t [address] [place] [address] agency. Encouraged patient to speak with agency Supervisor to allow for service recovery. Patient declined reporting that she \" does not care\". Patient reports that the nurses \" never come\" ; however she did confirm that a nurse came last week. Concern that she was contacted Monday for visit and no one came. ACM to reach out to agency. Will request that nurse supervisor contact patient directly. Inquired as to whether patient wanted to be connected to another [address] [place] [address] agency. Patient declined. Reports that she just wants a nurse to come out today. Patient inquires as to whether wound should be cultured. ACM to follow up with Provider to request clarification. RPM metrics reviewed. BP: 102/78 110/76/96  102/78/93  104/79/78  129/82/99  No Reading(s)   139/80/96      Patient denies any questions or further needs. Offered patient enrollment in the Remote Patient Monitoring (RPM) program for in-home monitoring: Yes, patient already enrolled. Spoke with [facility] staff member. Informed of patient request for RN visit; concern that nursing staff are not visiting.   Staff member notes several documented RN

## 2023-08-30 RX ORDER — GABAPENTIN 600 MG/1
600 TABLET ORAL NIGHTLY
Qty: 30 TABLET | Refills: 0 | Status: SHIPPED | OUTPATIENT
Start: 2023-08-30 | End: 2023-09-29

## 2023-08-30 NOTE — TELEPHONE ENCOUNTER
Pt called upset today. She doesn't understand why you won't refill her nighttime Gabapentin after this last refill. She stated this office has always filled her nighttime one and her pain provider, Cody Ervin has always done the morning time. She stated she won't be the in between. She is requesting you call her pain provider and discuss this with him. Please advise.

## 2023-08-30 NOTE — TELEPHONE ENCOUNTER
Letter as attached will be sent to Hetal Fajardo.   I will not continue to prescribe her gabapentin as FIDEL reports her overdose risk score at 570, all sedating medication should be under the purvey of a single provider

## 2023-08-30 NOTE — TELEPHONE ENCOUNTER
I provided 130 days prescription for gabapentin 600 at bedtime, she takes daytime gabapentin and narcotics from pain provider Marty Pringle and so she was instructed to call him for the next nighttime medication refill

## 2023-08-31 ENCOUNTER — CARE COORDINATION (OUTPATIENT)
Dept: CARE COORDINATION | Age: 48
End: 2023-08-31

## 2023-08-31 NOTE — CARE COORDINATION
Ambulatory Care Coordination Note  2023    Patient Current Location:  Home: [address] [place] [address]     ACM contacted the patient by telephone. Verified name and  with patient as identifiers. Provided introduction to self, and explanation of the ACM role. Challenges to be reviewed by the provider   Additional needs identified to be addressed with provider: No  none               Method of communication with provider: none. ACM: Luis Garcia RN        Spoke with patient for ACM follow up. Patient reports that she is feeling ok. Patient reports that her appetite is good. Encouraged patient to maintain hydration; eat plenty of fruits and vegetables to promote wound healing. Patient reports that her wound is healing. Patient confirms contact per [facility] supervisor. Reports that she was informed that she was discharged from nursing. Discussed requesting referral to another agency. Patient reports that she \"does not need it\". Encouraged patient to update Provider if needs arise. Reviewed RPM metrics. No questions noted. 101/70/92  118/74/75  114/83/90  116/79/84  No Reading(s)   113/81/72  110/81/70  109/86/69    No questions, equipment or resource needs noted. Patient has ERS in place and receives home delivered meals. Offered patient enrollment in the Remote Patient Monitoring (RPM) program for in-home monitoring: Yes, patient already enrolled.     Plan for next outreach: RPM,  zone review    Lab Results       None                 Goals Addressed    None         Future Appointments   Date Time Provider [address]   2023 10:00 AM Addis Fried MD [facility]   10/9/2023 11:00 AM Reyes Scruggs [facility]   2023  3:40 PM Darya Burris MD [facility]    and   General Assessment

## 2023-09-01 ENCOUNTER — CARE COORDINATION (OUTPATIENT)
Dept: CARE COORDINATION | Age: 48
End: 2023-09-01

## 2023-09-01 NOTE — CARE COORDINATION
Attempted phone call to [facility] , Amrit Ybarra. No answer, voicemail message left requesting return call, contact number provided. GIOVANNI plan of care: GIOVANNI will make next outreach call to [facility] supervisor on 9/6 if no response by then.

## 2023-09-06 ENCOUNTER — CARE COORDINATION (OUTPATIENT)
Dept: CARE COORDINATION | Age: 48
End: 2023-09-06

## 2023-09-07 ENCOUNTER — CARE COORDINATION (OUTPATIENT)
Dept: CARE COORDINATION | Age: 48
End: 2023-09-07

## 2023-09-07 NOTE — CARE COORDINATION
Received incoming email form [employer] , Joel Foote stating the DME has been requested and as of 9/6 nothing has been shipped. Chu Vallejo stated she could reach out to Pt to provide update. This SW responded asking if any assistance is needed. Chu Vallejo responded with the following, :it has all been ordered and the required physical therapy evaluation in her home was completed as well    Patient Current Location: Home: [address] [address]     Phone call to Madeline Kim to provide update. Madeline Kim reported that she received a text message this morning from Chu MITCHELL stating the following, \"Good morning leana, lift chair was denied bc you are non weight bearing - you have a right to appeal this\"     Madeline Kim reported that she did state the Motorized w/c and BSC are still pending. GIOVANNI sent Jeremie Montenegro CM another email asking about the entrance ramp and another email following conversation with Pt asking about the appeal process for the lift chair. Denied for lift chair - not weight bearing    Pt reported she is working with an  and did discuss the denial for the lift chair appeal with her. GIOVANNI plan of care: GIOVANNI will follow up with Pt and Nereida MITCHELL on 9/14 regarding lift chair and appeal process.

## 2023-09-11 ENCOUNTER — CARE COORDINATION (OUTPATIENT)
Dept: CARE COORDINATION | Age: 48
End: 2023-09-11

## 2023-09-11 NOTE — CARE COORDINATION
Patient Current Location: Home: [facility] [address]     Received incoming call from Pt stating she was notified by [employer] Lizama , Kimberly Conner that she would need to appeal the decision regarding the lift chair. SW offered to assist her with process of appeal. Pt reported she contacted an ombudsman who is going to reach out to assigned . SW offered additional support. Pt reported the ombudsman is giving the  until 9/13 to respond. SW will follow up with Pt on 9/15 to discuss response from ombudsman. GIOVANNI plan of care: SW will follow up with Pt on 9/15 regarding appeal for lift chair through Pepe Lizama.

## 2023-09-15 ENCOUNTER — CARE COORDINATION (OUTPATIENT)
Dept: CARE COORDINATION | Age: 48
End: 2023-09-15

## 2023-09-15 NOTE — CARE COORDINATION
Patient Current Location: Home: [address] [address]     Phone call to Pt to follow up regarding the lift chair. Pt reported the budsman is opening a case. Pt is unsure if she is supposed to file an appeal or just wait. SW offered to call [facility]. Pt provided verbal consent. Abraham Ashley - [phone] - Phone call to Abraham Ashley office, no answer, voicemail message left requesting return call, contact number provided. GIOVANNI plan of care: GIOVANNI will make next outreach attempt to [facility] office on 9/19 if no response by then.

## 2023-09-15 NOTE — CARE COORDINATION
Patient Current Location: Home: [address] [address]     Phone call to Pt to follow up regarding the lift chair. Pt reported the Ombudsman is opening a case. Pt is unsure if she is supposed to file an appeal or just wait. GIOVANNI offered to call ombudsman. Pt provided verbal consent. Suzi Aviles - [phone] - Phone call to Suzi Aviles office, no answer, voicemail message left requesting return call, contact number provided. Received incoming call from Brenda with Ombudsman office who asked if this SW can assist. GIOVANNI discussed difficulties both this SW and Pt have had with getting in contact with Pt's . GIOVANNI asked if Pt should appeal the decision. Brenda reported that yes Pt should appeal and they will not open a case until the hearing occurs and if Pt is still denied. Phone call to Pt to provide update. Pt was upset, stating she does not have time to complete appeal process.  GIOVANNI discussed plans to provide referral to [facility], but stated this SW is not sure of age restrictions with this program. Pt was in agreement with referral.     GIOVANNI plan of care: GIOVANNI will follow up with Pt regarding reclining / lift chair on 9/19

## 2023-09-18 ENCOUNTER — TELEPHONE (OUTPATIENT)
Dept: FAMILY MEDICINE CLINIC | Age: 48
End: 2023-09-18

## 2023-09-18 ENCOUNTER — CARE COORDINATION (OUTPATIENT)
Dept: CARE COORDINATION | Age: 48
End: 2023-09-18

## 2023-09-18 NOTE — CARE COORDINATION
Remote Patient Monitoring Note  Date/Time:  9/18/2023 3:52 PM  Patient Current Location: [place]  RN  attempted to contact patient by telephone regarding  No VS x4 days. Background: Pt enrolled in RPM r/t COPD  Clinical Interventions: Reviewed and followed up on alerts and treatments-Left HIPAA compliant  for pt. Plan/Follow Up: Will continue to review, monitor and address alerts with follow up based on severity of symptoms and risk factors.

## 2023-09-18 NOTE — TELEPHONE ENCOUNTER
Rigo, care coordinator called to verify pt weight baring status for her lift chair, there is some discrepancy on her weight baring capacity and the medical supply store will not provide her with lift chair with out confirmation. Please advise.

## 2023-09-19 ENCOUNTER — CARE COORDINATION (OUTPATIENT)
Dept: CARE COORDINATION | Age: 48
End: 2023-09-19

## 2023-09-19 NOTE — CARE COORDINATION
RPM RN notified of no VS x 5 days. Attempted to reach patient for ACM follow up; reminder to enter daily VS. No answer to phone. Message left with ACM contact information and request for call back.

## 2023-09-19 NOTE — CARE COORDINATION
Remote Patient Monitoring Note  Date/Time:  9/19/2023 2:21 PM  Patient Current Location: [place]  RN  attempted to contact patient by telephone regarding No VS metrics x5 days. Background: Pt enrolled in RPM r/t COPD  Clinical Interventions: Reviewed and followed up on alerts and treatments-RN call to pt - no answer. Left HIPAA complaint VM for pt. Routed message to [employer] as FYI. Plan/Follow Up: Will continue to review, monitor and address alerts with follow up based on severity of symptoms and risk factors.

## 2023-09-20 ENCOUNTER — CARE COORDINATION (OUTPATIENT)
Dept: CARE COORDINATION | Age: 48
End: 2023-09-20

## 2023-09-20 NOTE — CARE COORDINATION
Remote Patient Monitoring Note  Date/Time:  9/20/2023 1:33 PM  Patient Current Location: [place]\RN attempted to contact patient by telephone regarding No VS x6 days. Background: Pt enrolled in RPM r/t COPD  Clinical Interventions: Reviewed and followed up on alerts and treatments-Left HIPAA compliant VM for pt - ACM notified yesterday and attempted outreach and left VM. Plan/Follow Up: Will continue to review, monitor and address alerts with follow up based on severity of symptoms and risk factors.

## 2023-09-21 ENCOUNTER — CARE COORDINATION (OUTPATIENT)
Dept: CARE COORDINATION | Age: 48
End: 2023-09-21

## 2023-09-21 ENCOUNTER — OFFICE VISIT (OUTPATIENT)
Dept: CARDIOLOGY CLINIC | Age: 48
End: 2023-09-21
Payer: COMMERCIAL

## 2023-09-21 VITALS
WEIGHT: 136 LBS | DIASTOLIC BLOOD PRESSURE: 78 MMHG | BODY MASS INDEX: 21.86 KG/M2 | SYSTOLIC BLOOD PRESSURE: 122 MMHG | HEART RATE: 113 BPM | HEIGHT: 66 IN

## 2023-09-21 DIAGNOSIS — E03.9 ACQUIRED HYPOTHYROIDISM: ICD-10-CM

## 2023-09-21 DIAGNOSIS — R00.2 PALPITATIONS: ICD-10-CM

## 2023-09-21 DIAGNOSIS — E04.9 ENLARGED THYROID: ICD-10-CM

## 2023-09-21 DIAGNOSIS — G43.009 NONINTRACTABLE MIGRAINE, UNSPECIFIED MIGRAINE TYPE: ICD-10-CM

## 2023-09-21 DIAGNOSIS — K21.9 GASTROESOPHAGEAL REFLUX DISEASE WITHOUT ESOPHAGITIS: ICD-10-CM

## 2023-09-21 DIAGNOSIS — R55 NEUROCARDIOGENIC SYNCOPE: Primary | ICD-10-CM

## 2023-09-21 DIAGNOSIS — R00.0 INAPPROPRIATE SINUS TACHYCARDIA: ICD-10-CM

## 2023-09-21 PROBLEM — I47.11 INAPPROPRIATE SINUS TACHYCARDIA: Status: ACTIVE | Noted: 2023-09-21

## 2023-09-21 PROCEDURE — G8420 CALC BMI NORM PARAMETERS: HCPCS | Performed by: INTERNAL MEDICINE

## 2023-09-21 PROCEDURE — 99214 OFFICE O/P EST MOD 30 MIN: CPT | Performed by: INTERNAL MEDICINE

## 2023-09-21 PROCEDURE — 3017F COLORECTAL CA SCREEN DOC REV: CPT | Performed by: INTERNAL MEDICINE

## 2023-09-21 PROCEDURE — G8427 DOCREV CUR MEDS BY ELIG CLIN: HCPCS | Performed by: INTERNAL MEDICINE

## 2023-09-21 PROCEDURE — 1036F TOBACCO NON-USER: CPT | Performed by: INTERNAL MEDICINE

## 2023-09-21 RX ORDER — METOPROLOL TARTRATE 50 MG/1
50 TABLET, FILM COATED ORAL 2 TIMES DAILY
Qty: 60 TABLET | Refills: 5 | Status: SHIPPED | OUTPATIENT
Start: 2023-09-21

## 2023-09-21 NOTE — CARE COORDINATION
Remote Patient Monitoring Note  Date/Time:  9/21/2023 1:30 PM  Patient Current Location: [place]  RN  attempted to contactpatient by telephone regarding No VS x7 days. Background: Pt enrolled in RPM r/t COPD. Clinical Interventions: Reviewed and followed up on alerts and treatments-Left HIPAA compliant VM for pt reminded to enter daily metrics and return call to RN. Message routed to [employer] for [facility]. Plan/Follow Up: Will continue to review, monitor and address alerts with follow up based on severity of symptoms and risk factors.

## 2023-09-21 NOTE — PROGRESS NOTES
Syncope: stable on Midodrine                                                           Tobacco abuse: Stopped smoking   ARRHYTHMIAS: C/O Palpitations    2/20/2023 event monitor       Baseline rhythm is normal sinus. Episodic tachycardias note, fastest at 154/min. No other clinically significant arrhythmias. Clinical correlation is recommended. Patient says her HR remains above 100 all the time. ? Inappropriate sinus tachycardia. TESTS ORDERED:none this visit     PREVIOUSLY ORDERED TESTS REVIEWED & DISCUSSED WITH THE PATIENT:     I personally reviewed & interpreted, all previously ordered tests as copied above. Latest Labs are pulled in to the note with dates. Labs, specially in Reference to Lipid profile, Cardiac testing in the form of Echo ( dated: ), stress tests ( dated: ) & other relevant cardiac testing reviewed with patient & recommendations made based on assessment of the results. Discussed role of Cardiac risk factors & effects + treatment of co morbidities with patient & advised accordingly. MEDICATIONS: List of medications patient is currently taking is reviewed in detail with the patient & family member present. Discussed any side effects or problems taking the medication. Recommend: Increase Metoprolol to 50 mg BID. AFFIRMATION: I reviewed patient's history, previous & current medical problems & all Labs + testing. This includes chart prep even prior to the vosit. Various goals are discussed and multiple questions answered. Relevant concelling performed. Office follow up in 3 months.

## 2023-09-21 NOTE — PATIENT INSTRUCTIONS
CORONARY ARTERY DISEASE:None known. No C/O Chest pains. EKG: NSR 74/min, low voltage in pre-cordial leads. HTN:well controlled on current medical regimen, see list above. - changes in  treatment:   no, on Metoprolol     CARDIOMYOPATHY: None known   CONGESTIVE HEART FAILURE: NO KNOWN HISTORY.      VHD: No significant VHD noted  1/31/2023   Left ventricular function is hyperdynamic, EF is estimated at 60-65%. Left ventricle size is normal.   Mild left ventricular hypertrophy. Grade I diastolic dysfunction. No regional wall motion abnormalities were detected. No significant valvular abnormalities. Mild mitral regurgitation is present. No evidence of pericardial effusion. DYSLIPIDEMIA: Patient's profile is at / near [facility],                                                                           See most recent Lab values in Labs section above. OTHER RELEVANT DIAGNOSIS:as noted in patient's active problem list:                                                           GERD                                                           NC Syncope: stable on Midodrine                                                           Tobacco abuse: Stopped smoking   ARRHYTHMIAS: C/O Palpitations    2/20/2023 event monitor       Baseline rhythm is normal sinus. Episodic tachycardias note, fastest at 154/min. No other clinically significant arrhythmias. Clinical correlation is recommended. Patient says her HR remains above 100 all the time. ? Inappropriate sinus tachycardia. TESTS ORDERED:none this visit     PREVIOUSLY ORDERED TESTS REVIEWED & DISCUSSED WITH THE PATIENT:     I personally reviewed & interpreted, all previously ordered tests as copied above. Latest Labs are pulled in to the note with dates.    Labs, specially in Reference to Lipid profile, Cardiac testing in the form of Echo ( dated: ), stress tests ( dated: ) & other relevant cardiac testing

## 2023-09-22 ENCOUNTER — CARE COORDINATION (OUTPATIENT)
Dept: CARE COORDINATION | Age: 48
End: 2023-09-22

## 2023-09-22 RX ORDER — MECLIZINE HYDROCHLORIDE 25 MG/1
25 TABLET ORAL DAILY PRN
Qty: 90 TABLET | Refills: 0 | Status: SHIPPED | OUTPATIENT
Start: 2023-09-22

## 2023-09-22 RX ORDER — FAMOTIDINE 20 MG/1
TABLET, FILM COATED ORAL
Qty: 180 TABLET | Refills: 0 | Status: SHIPPED | OUTPATIENT
Start: 2023-09-22

## 2023-09-22 RX ORDER — ASPIRIN 81 MG/1
TABLET, CHEWABLE ORAL
Qty: 90 TABLET | Refills: 0 | Status: SHIPPED | OUTPATIENT
Start: 2023-09-22

## 2023-09-22 RX ORDER — TOPIRAMATE 25 MG/1
TABLET ORAL
Qty: 270 TABLET | Refills: 0 | Status: SHIPPED | OUTPATIENT
Start: 2023-09-22

## 2023-09-22 RX ORDER — GABAPENTIN 600 MG/1
600 TABLET ORAL
Qty: 90 TABLET | Refills: 0 | Status: SHIPPED | OUTPATIENT
Start: 2023-09-22 | End: 2023-12-21

## 2023-09-22 RX ORDER — TIZANIDINE 4 MG/1
TABLET ORAL
Qty: 90 TABLET | Refills: 0 | Status: SHIPPED | OUTPATIENT
Start: 2023-09-22

## 2023-09-22 RX ORDER — LEVOTHYROXINE SODIUM 0.1 MG/1
100 TABLET ORAL DAILY
Qty: 90 TABLET | Refills: 0 | Status: SHIPPED | OUTPATIENT
Start: 2023-09-22

## 2023-09-22 NOTE — CARE COORDINATION
Remote Patient Monitoring Note  Date/Time:      9/22/2023 12:17 PM  Patient Current Location: [place]  RN  attempted to contactpatient by telephone regarding No VS x8 days. Background: Pt enrolled in RPM r/t COPD. Clinical Interventions: Reviewed and followed up on alerts and treatments-Left HIPAA compliant VM for pt reminded to enter daily metrics and return call to RN. Plan/Follow Up: Will continue to review, monitor and address alerts with follow up based on severity of symptoms and risk factors.

## 2023-09-27 ENCOUNTER — CARE COORDINATION (OUTPATIENT)
Dept: CARE COORDINATION | Age: 48
End: 2023-09-27

## 2023-09-27 NOTE — CARE COORDINATION
Date/Time:  2023 9:56 AM  RN  attempted to reach patient by telephone regarding red alert (Pulse ox ) in remote patient monitoring program. Left HIPPA compliant message requesting a return call. Will attempt to reach patient again. Remote Alert Monitoring Note  Rpm alert to be reviewed by the provider   red alert   pulse ox heart rate (122)   Additional needs to be addressed by PCP: No     Date/Time:  2023 11:37 AM  Patient Current Location: Home: [address] [place] [address] Road [facility]  RN  contacted patient by telephone. Verified patients name and  as identifiers. Background: Pt enrolled in RPM r/t COPD  Refer to 911 immediately if:  Patient unresponsive or unable to provide history  Change in cognition or sudden confusion  Patient unable to respond in complete sentences  Intense chest pain/tightness  Any concern for any clinical emergency  Red Alert: Provider response time of 1 hr required for any red alert requiring intervention  Yellow Alert: Provider response time of 3hr required for any escalated yellow alert  HR Triage  Are you having any Chest Pain? no   Are you having any Shortness of Breath? no   Are you having any dizziness? no   Are you feeling more fatigued or tired than normal? no   Are you having any other health concerns or issues? no   Clinical Interventions: Reviewed and followed up on alerts and treatments-RN spoke to pt regarding red alert for pulse ox . Pt reports feeling \"ok\" and that her Cardiology provider is aware of HR and has made medications changes. Has taken prescribed medications. Denies any CP, SOB. Pt agreeable to recheck - new metric . No escalation needed a this time. Plan/Follow Up: Will continue to review, monitor and address alerts with follow up based on severity of symptoms and risk factors.

## 2023-09-27 NOTE — CARE COORDINATION
Ambulatory Care Coordination Note  2023    Patient Current Location:  Home: [address] [address]     ACM contacted the patient by telephone. Verified name and  with patient as identifiers. Provided introduction to self, and explanation of the ACM role. Challenges to be reviewed by the provider   Additional needs identified to be addressed with provider: No  none               Method of communication with provider: chart routing    RPM red alert noted. Patient rechecked . Patient denies CP, SOB , dizziness or palpitations. Thoughts are clear; patient is speaking in complete sentences. Instructed on s/s to report to MD/ when to call 911. Patient confirms recent Cardiology follow up. Notes that she is taking  increased Metoprolol a 50 mg BID as directed. Patient reports that elevated HR is due to \"pain and irritation\". Patient confirms that she follows with Pain Management. Patient reports agitation in r/t \" no one doing their jobs\". No clarification provided. Instructed on worsening s/s to report to MD/ when to call 911. Encouraged continued monitoring. Further instruction attempted and patient hung up the phone. Will continue to monitor. Plan for next outreach:  RPM review        Lab Results       None                 Goals Addressed                   This Visit's Progress     Conditions and Symptoms   No change     I will schedule office visits, as directed by my provider. I will keep my appointment or reschedule if I have to cancel. I will notify my provider of any barriers to my plan of care. I will follow my Zone Management tool to seek urgent or emergent care. I will notify my provider of any symptoms that indicate a worsening of my condition. Barriers: overwhelmed by complexity of regimen  Plan for overcoming my barriers: Patient will follow zone protocol for symptom management.   Confidence:  8/10  Anticipated Goal Completion

## 2023-10-02 ENCOUNTER — CARE COORDINATION (OUTPATIENT)
Dept: CARE COORDINATION | Age: 48
End: 2023-10-02

## 2023-10-02 NOTE — CARE COORDINATION
Remote Patient Monitoring Note  Date/Time:  10/2/2023 12:26 PM  Patient Current Location: [place]  RN  contacted patient by telephone regarding No VS x3 days. Verified patients name and  as identifiers. Background: Pt enrolled in RPM r/t COPD. Clinical Interventions: Reviewed and followed up on alerts and treatments-Spoke to pt regarding no VS x 3 days. States she had forgotten to enter VS. RN explained pt can enter after noon if unable to complete earlier in the day. Plan/Follow Up: Will continue to review, monitor and address alerts with follow up based on severity of symptoms and risk factors.

## 2023-10-04 ENCOUNTER — CARE COORDINATION (OUTPATIENT)
Dept: CARE COORDINATION | Age: 48
End: 2023-10-04

## 2023-10-04 NOTE — CARE COORDINATION
Attempted to reach patient for ACM follow up. No answer to phone. Message left with request for return call.

## 2023-10-06 VITALS — OXYGEN SATURATION: 98 % | DIASTOLIC BLOOD PRESSURE: 91 MMHG | HEART RATE: 83 BPM | SYSTOLIC BLOOD PRESSURE: 137 MMHG

## 2023-10-09 ENCOUNTER — OFFICE VISIT (OUTPATIENT)
Dept: ORTHOPEDIC SURGERY | Age: 48
End: 2023-10-09
Payer: COMMERCIAL

## 2023-10-09 VITALS
HEIGHT: 66 IN | RESPIRATION RATE: 14 BRPM | OXYGEN SATURATION: 97 % | WEIGHT: 135 LBS | BODY MASS INDEX: 21.69 KG/M2 | HEART RATE: 68 BPM

## 2023-10-09 DIAGNOSIS — S82.841S: ICD-10-CM

## 2023-10-09 DIAGNOSIS — S72.422S CLOSED BICONDYLAR FRACTURE OF LEFT FEMUR, SEQUELA: ICD-10-CM

## 2023-10-09 DIAGNOSIS — M25.571 RIGHT ANKLE PAIN, UNSPECIFIED CHRONICITY: Primary | ICD-10-CM

## 2023-10-09 DIAGNOSIS — M25.562 LEFT KNEE PAIN, UNSPECIFIED CHRONICITY: ICD-10-CM

## 2023-10-09 DIAGNOSIS — S72.432S CLOSED BICONDYLAR FRACTURE OF LEFT FEMUR, SEQUELA: ICD-10-CM

## 2023-10-09 PROCEDURE — G8428 CUR MEDS NOT DOCUMENT: HCPCS | Performed by: PHYSICIAN ASSISTANT

## 2023-10-09 PROCEDURE — 3017F COLORECTAL CA SCREEN DOC REV: CPT | Performed by: PHYSICIAN ASSISTANT

## 2023-10-09 PROCEDURE — G8484 FLU IMMUNIZE NO ADMIN: HCPCS | Performed by: PHYSICIAN ASSISTANT

## 2023-10-09 PROCEDURE — 4004F PT TOBACCO SCREEN RCVD TLK: CPT | Performed by: PHYSICIAN ASSISTANT

## 2023-10-09 PROCEDURE — G8420 CALC BMI NORM PARAMETERS: HCPCS | Performed by: PHYSICIAN ASSISTANT

## 2023-10-09 PROCEDURE — 99212 OFFICE O/P EST SF 10 MIN: CPT | Performed by: PHYSICIAN ASSISTANT

## 2023-10-09 NOTE — PATIENT INSTRUCTIONS
PT ordered  Work on ROM and strengthening exercises per PT protocol  Rest, ice, and elevate  Weightbearing as tolerated  Follow up in 8 weeks

## 2023-10-10 ENCOUNTER — TELEPHONE (OUTPATIENT)
Dept: SURGERY | Age: 48
End: 2023-10-10

## 2023-10-10 NOTE — TELEPHONE ENCOUNTER
PT CALLED WANTING TO RESCHEDULE - PT GIVEN NEW DATES / TIME    SURGERY- 10/31/23 @ 930  PO- 11/13/23 @ [address] Lilly

## 2023-10-13 ENCOUNTER — CARE COORDINATION (OUTPATIENT)
Dept: CARE COORDINATION | Age: 48
End: 2023-10-13

## 2023-10-13 DIAGNOSIS — J44.0 CHRONIC OBSTRUCTIVE PULMONARY DISEASE WITH ACUTE LOWER RESPIRATORY INFECTION (HCC): Primary | ICD-10-CM

## 2023-10-13 NOTE — PROGRESS NOTES
Remote Patient Order Discontinued    Received request from Will Adkins RN  to discontinue order for remote patient monitoring of COPD and order completed.

## 2023-10-13 NOTE — CARE COORDINATION
EMTP placed call to patient to arrange RPM kit  through [facility]. Reviewed with patient how to pack equipment in original packing. Verified patient's availability to schedule UPS  time. UPS  time requested.  Anticipated  date range : Anytime

## 2023-10-13 NOTE — CARE COORDINATION
Ambulatory Care Coordination Note  10/13/2023    Patient Current Location:  Home: [address] [place] [address]     ACM contacted the patient by telephone. Verified name and  with patient as identifiers. Provided introduction to self, and explanation of the ACM role. Challenges to be reviewed by the provider   Additional needs identified to be addressed with provider: No  none               Method of communication with provider: none. ACM: Cely Stevens, RN        Spoke with patient for ACM follow up. Patient reports that she is doing ok. Reviewed RPM metrics. Patient requests to discontinue this program.     Offered patient enrollment in the Remote Patient Monitoring (RPM) program for in-home monitoring: Yes, patient already enrolled. Request to d/c. Remote Patient Monitoring Disenrollment      Date/Time:  10/13/2023 9:40 AM  Patient Current Location: Home: [address] [place] [address]  Patient has been dis-enrolled from Remote Patient Monitoring program on 10/13/2023 due to no longer Interested. Patient has been provided instruction on process to return RPM equipment and RPM has been deactivated. Patient has ACM's contact information for any further questions, concerns, or needs. Plan for next outreach: graduation    Note sent to RPM team to request d/c of program.     Lab Results       None                 Goals Addressed                   This Visit's Progress     Conditions and Symptoms   No change     I will schedule office visits, as directed by my provider. I will keep my appointment or reschedule if I have to cancel. I will notify my provider of any barriers to my plan of care. I will follow my Zone Management tool to seek urgent or emergent care. I will notify my provider of any symptoms that indicate a worsening of my condition.     Barriers: overwhelmed by complexity of regimen  Plan for overcoming my barriers: Patient will follow zone

## 2023-10-18 ENCOUNTER — HOSPITAL ENCOUNTER (OUTPATIENT)
Dept: PHYSICAL THERAPY | Age: 48
Setting detail: THERAPIES SERIES
Discharge: HOME | End: 2023-10-18
Payer: COMMERCIAL

## 2023-10-18 PROCEDURE — 97163 PT EVAL HIGH COMPLEX 45 MIN: CPT

## 2023-10-18 PROCEDURE — 97110 THERAPEUTIC EXERCISES: CPT

## 2023-10-18 ASSESSMENT — PAIN SCALES - GENERAL: PAINLEVEL_OUTOF10: 10

## 2023-10-18 ASSESSMENT — PAIN DESCRIPTION - ORIENTATION: ORIENTATION: RIGHT;LEFT

## 2023-10-18 ASSESSMENT — PAIN DESCRIPTION - DESCRIPTORS: DESCRIPTORS: ACHING

## 2023-10-18 ASSESSMENT — PAIN DESCRIPTION - PAIN TYPE: TYPE: CHRONIC PAIN

## 2023-10-18 ASSESSMENT — PAIN DESCRIPTION - LOCATION: LOCATION: ANKLE;KNEE
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
## 2023-10-18 NOTE — PROGRESS NOTES
Physical Therapy: Initial Evaluation    Patient: Sebastian Pollock (1 y.o. female)   Examination Date: 50/10/1884  Plan of Care Certification Period: 10/18/2023 to        :  1975 ;    Confirmed: Yes MRN: [id number]  CSN: [id number]   Insurance: Payor: Ethyl Her / Plan: Wolf Scarce / Product Type: *No Product type* /   Insurance ID: [id number] - (Medicare Managed) Secondary Insurance (if applicable):    Referring Physician: Delia Rawls PA-C     PCP: Jesse Harmon MD Visits to Date/Visits Approved:   /      No Show/Cancelled Appts:   /       Medical Diagnosis: Right ankle pain, unspecified chronicity [M25.571]  Left knee pain, unspecified chronicity [M25.562]    Treatment Diagnosis: LE pain, L LE weakness, R ankle weakness, difficulty with transfers and mobility     PERTINENT MEDICAL HISTORY   Patient Assessed for Rehabilitation Services: Yes  Self reported health status[de-identified] Good    Medical History: Chart Reviewed: Yes   Past Medical History:   Diagnosis Date    Asthma     Chest pain     Cigarette smoker     Stopped smoking    COPD (chronic obstructive pulmonary disease) ([address])     Last exac: 2018      (updated 10/6/22)    Dense breast tissue     Endometriosis     Fatigue     Fibromyalgia     History of cardiac monitoring     History of left heart catheterization 09/15/2014    Normal angiographic coronary arteries w/o luminal disease. Normal LVSV w/ LVEF 65% w/ normal wall motion. No significant aoryic stenosis or MR. Hx of cardiovascular stress test 2010    EF 70%. Normal myocardial perfusion scan demonstrating an attenuation artifact in the anterior region of the myocardium. NO ischemia or infarct/scar seen in the remaining myocardium. Hx of cardiovascular stress test 07/15/2014    LexiScan: EF 66%. Mild partially reversible defect in mid-apical anterior wall possibly consistent with ischemia. Hx of echocardiogram 2009    EF >55%.  Mild
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
## 2023-10-20 ENCOUNTER — CARE COORDINATION (OUTPATIENT)
Dept: CARE COORDINATION | Age: 48
End: 2023-10-20

## 2023-10-20 DIAGNOSIS — J44.9 CHRONIC OBSTRUCTIVE PULMONARY DISEASE, UNSPECIFIED COPD TYPE (HCC): ICD-10-CM

## 2023-10-20 NOTE — CARE COORDINATION
Ambulatory Care Coordination Note  10/20/2023    Patient Current Location:  Home: [address] [place] [address]     ACM contacted the patient by telephone. Verified name and  with patient as identifiers. Provided introduction to self, and explanation of the ACM role. Challenges to be reviewed by the provider   Additional needs identified to be addressed with provider: No  none               Method of communication with provider: none. ACM: Ishaan Baugh RN        Spoke with patient for ACM follow up. Patient reports that she is ok. Confirmed RPM equipment . Patient is actively following with Outpatient PT. Discussed care gaps:  reviewed upcoming PT/ MD appts. Instructed on same day sick, tele-health options for urgent care needs. Offered patient enrollment in the Remote Patient Monitoring (RPM) program for in-home monitoring: Yes, patient already enrolled. Recent d/c    Patient denies any questions or concerns at this time. Instructed patient to contact Provider if needs arise. No further outreach planned. Lab Results       None                 Goals Addressed                      This Visit's Progress      COMPLETED: Conditions and Symptoms   No change      I will schedule office visits, as directed by my provider. I will keep my appointment or reschedule if I have to cancel. I will notify my provider of any barriers to my plan of care. I will follow my Zone Management tool to seek urgent or emergent care. I will notify my provider of any symptoms that indicate a worsening of my condition. Barriers: overwhelmed by complexity of regimen  Plan for overcoming my barriers: Patient will follow zone protocol for symptom management.   Confidence:  810  Anticipated Goal Completion Date: 23          COMPLETED: Patient Stated (pt-stated)   No change      Pt reported she is waiting on lift chair, motorized w/c, BSC,w/c, and ramp    Barriers: lack of

## 2023-10-23 ENCOUNTER — TELEPHONE (OUTPATIENT)
Dept: FAMILY MEDICINE CLINIC | Age: 48
End: 2023-10-23

## 2023-10-23 ENCOUNTER — CARE COORDINATION (OUTPATIENT)
Dept: CARE COORDINATION | Age: 48
End: 2023-10-23

## 2023-10-23 NOTE — CARE COORDINATION
Sent email to Caitlin, [employer] and her supervisor, Myrtle Sawyer requesting a response to determine if Pt could enter a new request for a lift chair as she has now seen orthopedics and PT who both stated Pt is weight bearing as tolerated. GIOVANNI plan of care: SW will await response from Caitlin and supervisor. GIOVANNI will make next outreach to [facility] on 10/26 if no response prior to that.

## 2023-10-23 NOTE — TELEPHONE ENCOUNTER
Information and chart reviewed from [facility] regarding lift chair order. I have been off work and so went to review patient's last visit with orthopedics this occurred on 10/9/2023 where it was concluded that she should have \"weightbearing as tolerated\" and should follow-up with PT, PT evaluation is also in the chart. So we can send revised data if you would like to insurance that she is progressing as we were hopeful that she would do and she is now weightbearing as tolerated. You can also send the referral to the community resource as well. I can support either passageway.

## 2023-10-23 NOTE — TELEPHONE ENCOUNTER
----- Message from MARTHA Proctor CNP sent at 9/26/2023  3:36 PM EDT -----  Regarding: RE: lift chair  I replied to [facility] and told her I would leave this in your inbox to review. Also suggested she check with ortho about ability to bear weight at this time as I am not familiar with the patient and cannot advise her on the best course of action at this time. ----- Message -----  From: NARCISA Thompson  Sent: 9/26/2023  11:20 AM EDT  To: Aneta Vargas MD  Subject: lift chair                                       Dr. Helga Hui, I am writing about Pt's request for a lift chair. She requested a lift chair in May and Fei Mena denied the request as of 9/6. The chair was denied as a result of the PT evaluation which stated Pt is non weight bearing. Pt is stating that is not currently the case. If we appeal this decision, she would likely lose the appeal and I'm sure she could start over again with requesting it, but it might take a while. I found a community resource ([employer]) that would purchase a new one for her from [facility] and have it delivered to her home. Before I send the referral, I wanted to get your thoughts and recommendations on a lift chair and any concerns you might have regarding safety if she is truly still non weight bearing or if she has had any changes. Thank you in advance for your time!   Getachew Meeks, [facility]  , Care Coordination  [phone]

## 2023-10-24 ENCOUNTER — HOSPITAL ENCOUNTER (OUTPATIENT)
Dept: PHYSICAL THERAPY | Age: 48
Discharge: HOME | End: 2023-10-24

## 2023-10-24 NOTE — FLOWSHEET NOTE
Physical Therapy  Cancellation/No-show Note  Patient Name:  Isi Berry  :  1975   Date:  10/24/2023  Cancelled visits to date: 1  No-shows to date: 0    For today's appointment patient:  [x]  Cancelled  []  Rescheduled appointment  []  No-show     Reason given by patient:  []  Patient ill  []  Conflicting appointment  []  No transportation    []  Conflict with work  []  No reason given  [x]  Other:     Comments:  Unable to make the appointment    Electronically signed by:  Jaret Lizama, PAL      10/24/2023,1:09 PM

## 2023-10-26 ENCOUNTER — HOSPITAL ENCOUNTER (OUTPATIENT)
Dept: PHYSICAL THERAPY | Age: 48
Discharge: HOME | End: 2023-10-26

## 2023-10-26 NOTE — FLOWSHEET NOTE
Physical Therapy  Cancellation/No-show Note  Patient Name:  Dinesh Potter  :  1975   Date:  10/26/2023  Cancelled visits to date: 1  No-shows to date: 1    For today's appointment patient:  []  Cancelled  []  Rescheduled appointment  [x]  No-show     Reason given by patient:  []  Patient ill  []  Conflicting appointment  []  No transportation    []  Conflict with work  []  No reason given  []  Other:     Comments:       Electronically signed by:  Arie Samayoa PT, DPT, OCS     10/26/2023 3:38 PM

## 2023-10-27 ENCOUNTER — HOSPITAL ENCOUNTER (OUTPATIENT)
Age: 48
Discharge: HOME | End: 2023-10-27
Payer: COMMERCIAL

## 2023-10-27 ENCOUNTER — CARE COORDINATION (OUTPATIENT)
Dept: CARE COORDINATION | Age: 48
End: 2023-10-27

## 2023-10-27 DIAGNOSIS — Z01.818 PRE-OP TESTING: ICD-10-CM

## 2023-10-27 LAB
ANION GAP SERPL CALCULATED.3IONS-SCNC: 12 MMOL/L (ref 4–16)
BASOPHILS ABSOLUTE: 0 K/CU MM
BASOPHILS RELATIVE PERCENT: 0.5 % (ref 0–1)
BUN SERPL-MCNC: 11 MG/DL (ref 6–23)
CALCIUM SERPL-MCNC: 10.5 MG/DL (ref 8.3–10.6)
CHLORIDE BLD-SCNC: 101 MMOL/L (ref 99–110)
CO2: 27 MMOL/L (ref 21–32)
CREAT SERPL-MCNC: 0.7 MG/DL (ref 0.6–1.1)
DIFFERENTIAL TYPE: ABNORMAL
EKG ATRIAL RATE: 119 BPM
EKG DIAGNOSIS: NORMAL
EKG P AXIS: 82 DEGREES
EKG P-R INTERVAL: 154 MS
EKG Q-T INTERVAL: 320 MS
EKG QRS DURATION: 66 MS
EKG QTC CALCULATION (BAZETT): 450 MS
EKG R AXIS: 70 DEGREES
EKG T AXIS: 57 DEGREES
EKG VENTRICULAR RATE: 119 BPM
EOSINOPHILS ABSOLUTE: 0.1 K/CU MM
EOSINOPHILS RELATIVE PERCENT: 1.4 % (ref 0–3)
GFR SERPL CREATININE-BSD FRML MDRD: >60 ML/MIN/1.73M2
GLUCOSE SERPL-MCNC: 99 MG/DL (ref 70–99)
HCT VFR BLD CALC: 45.9 % (ref 37–47)
HEMOGLOBIN: 15.1 GM/DL (ref 12.5–16)
IMMATURE NEUTROPHIL %: 0.3 % (ref 0–0.43)
LYMPHOCYTES ABSOLUTE: 2.8 K/CU MM
LYMPHOCYTES RELATIVE PERCENT: 36.4 % (ref 24–44)
MCH RBC QN AUTO: 29.8 PG (ref 27–31)
MCHC RBC AUTO-ENTMCNC: 32.9 % (ref 32–36)
MCV RBC AUTO: 90.7 FL (ref 78–100)
MONOCYTES ABSOLUTE: 0.3 K/CU MM
MONOCYTES RELATIVE PERCENT: 4.5 % (ref 0–4)
NUCLEATED RBC %: 0 %
PDW BLD-RTO: 12.4 % (ref 11.7–14.9)
PLATELET # BLD: 341 K/CU MM (ref 140–440)
PMV BLD AUTO: 9.1 FL (ref 7.5–11.1)
POTASSIUM SERPL-SCNC: 4.3 MMOL/L (ref 3.5–5.1)
RBC # BLD: 5.06 M/CU MM (ref 4.2–5.4)
SEGMENTED NEUTROPHILS ABSOLUTE COUNT: 4.4 K/CU MM
SEGMENTED NEUTROPHILS RELATIVE PERCENT: 56.9 % (ref 36–66)
SODIUM BLD-SCNC: 140 MMOL/L (ref 135–145)
TOTAL IMMATURE NEUTOROPHIL: 0.02 K/CU MM
TOTAL NUCLEATED RBC: 0 K/CU MM
WBC # BLD: 7.6 K/CU MM (ref 4–10.5)

## 2023-10-27 PROCEDURE — 36415 COLL VENOUS BLD VENIPUNCTURE: CPT

## 2023-10-27 PROCEDURE — 93005 ELECTROCARDIOGRAM TRACING: CPT | Performed by: ANESTHESIOLOGY

## 2023-10-27 PROCEDURE — 85025 COMPLETE CBC W/AUTO DIFF WBC: CPT

## 2023-10-27 PROCEDURE — 80048 BASIC METABOLIC PNL TOTAL CA: CPT

## 2023-10-27 NOTE — CARE COORDINATION
Patient Current Location: Home: [address] [address]     Phone call to Pt to follow up regarding lift chair. Discussed with Pt that this GIOVANNI consulted with PCP and notes from orthopedic dr and PT state Pt is able to bear weight as tolerated and GIOVANNI asked Nereida MITCHELL if they could restart process for lift chair and she was in agreement. Pt confirmed she did receive motorized w/c, but does not have ramp. Pt stated she has one entrance to her home and there are 2 steps. Discussed plan to add this request into the response email to Caitlin. GIOVANNI sent email to Mary Pat and her supervisor, Tristin Lopez notifying them that Pt is in agreement with new request for lift chair and she is requesting an entrance ramp. GIOVANNI plan of care: GIOVANNI will follow up with Pt regarding lift chair and entrance ramp in 2 weeks on 11/10.

## 2023-10-30 ENCOUNTER — ANESTHESIA EVENT (OUTPATIENT)
Dept: OPERATING ROOM | Age: 48
DRG: 330 | End: 2023-10-30
Payer: COMMERCIAL

## 2023-10-30 ASSESSMENT — ENCOUNTER SYMPTOMS: SHORTNESS OF BREATH: 1

## 2023-10-30 NOTE — PROGRESS NOTES
Patient notified of surgery time at [facility] 10/31/23 0930 arrival 0730, NPO instructions and DOS meds reviewed

## 2023-10-31 ENCOUNTER — HOSPITAL ENCOUNTER (INPATIENT)
Age: 48
LOS: 3 days | Discharge: HOME | DRG: 331 | End: 2023-11-03
Attending: SURGERY | Admitting: SURGERY
Payer: COMMERCIAL

## 2023-10-31 ENCOUNTER — ANESTHESIA (OUTPATIENT)
Dept: OPERATING ROOM | Age: 48
DRG: 330 | End: 2023-10-31
Payer: COMMERCIAL

## 2023-10-31 DIAGNOSIS — Z93.3 COLOSTOMY IN PLACE (HCC): ICD-10-CM

## 2023-10-31 DIAGNOSIS — Z01.818 PRE-OP TESTING: Primary | ICD-10-CM

## 2023-10-31 DIAGNOSIS — Z43.3 ATTENTION TO COLOSTOMY (HCC): ICD-10-CM

## 2023-10-31 PROCEDURE — 0DNW4ZZ RELEASE PERITONEUM, PERCUTANEOUS ENDOSCOPIC APPROACH: ICD-10-PCS | Performed by: SURGERY

## 2023-10-31 PROCEDURE — 1200000000 HC SEMI PRIVATE

## 2023-10-31 PROCEDURE — 3700000000 HC ANESTHESIA ATTENDED CARE: Performed by: SURGERY

## 2023-10-31 PROCEDURE — 2580000003 HC RX 258: Performed by: ANESTHESIOLOGY

## 2023-10-31 PROCEDURE — 2580000003 HC RX 258: Performed by: PHYSICIAN ASSISTANT

## 2023-10-31 PROCEDURE — 2709999900 HC NON-CHARGEABLE SUPPLY: Performed by: SURGERY

## 2023-10-31 PROCEDURE — S2900 ROBOTIC SURGICAL SYSTEM: HCPCS | Performed by: SURGERY

## 2023-10-31 PROCEDURE — 88307 TISSUE EXAM BY PATHOLOGIST: CPT | Performed by: PATHOLOGY

## 2023-10-31 PROCEDURE — 2500000003 HC RX 250 WO HCPCS: Performed by: NURSE ANESTHETIST, CERTIFIED REGISTERED

## 2023-10-31 PROCEDURE — 8E0W4CZ ROBOTIC ASSISTED PROCEDURE OF TRUNK REGION, PERCUTANEOUS ENDOSCOPIC APPROACH: ICD-10-PCS | Performed by: SURGERY

## 2023-10-31 PROCEDURE — 6360000002 HC RX W HCPCS: Performed by: NURSE ANESTHETIST, CERTIFIED REGISTERED

## 2023-10-31 PROCEDURE — 3700000001 HC ADD 15 MINUTES (ANESTHESIA): Performed by: SURGERY

## 2023-10-31 PROCEDURE — 0DBM4ZZ EXCISION OF DESCENDING COLON, PERCUTANEOUS ENDOSCOPIC APPROACH: ICD-10-PCS | Performed by: SURGERY

## 2023-10-31 PROCEDURE — 2720000010 HC SURG SUPPLY STERILE: Performed by: SURGERY

## 2023-10-31 PROCEDURE — 7100000001 HC PACU RECOVERY - ADDTL 15 MIN: Performed by: SURGERY

## 2023-10-31 PROCEDURE — 6370000000 HC RX 637 (ALT 250 FOR IP): Performed by: PHYSICIAN ASSISTANT

## 2023-10-31 PROCEDURE — 6360000002 HC RX W HCPCS: Performed by: PHYSICIAN ASSISTANT

## 2023-10-31 PROCEDURE — 44227 LAP CLOSE ENTEROSTOMY: CPT | Performed by: PHYSICIAN ASSISTANT

## 2023-10-31 PROCEDURE — 94761 N-INVAS EAR/PLS OXIMETRY MLT: CPT

## 2023-10-31 PROCEDURE — 88304 TISSUE EXAM BY PATHOLOGIST: CPT | Performed by: PATHOLOGY

## 2023-10-31 PROCEDURE — 6360000002 HC RX W HCPCS: Performed by: ANESTHESIOLOGY

## 2023-10-31 PROCEDURE — 44625 REPAIR BOWEL OPENING: CPT | Performed by: SURGERY

## 2023-10-31 PROCEDURE — 3600000019 HC SURGERY ROBOT ADDTL 15MIN: Performed by: SURGERY

## 2023-10-31 PROCEDURE — APPNB180 APP NON BILLABLE TIME > 60 MINS: Performed by: PHYSICIAN ASSISTANT

## 2023-10-31 PROCEDURE — 3600000009 HC SURGERY ROBOT BASE: Performed by: SURGERY

## 2023-10-31 PROCEDURE — 7100000000 HC PACU RECOVERY - FIRST 15 MIN: Performed by: SURGERY

## 2023-10-31 PROCEDURE — 6360000002 HC RX W HCPCS: Performed by: SURGERY

## 2023-10-31 PROCEDURE — 2580000003 HC RX 258: Performed by: NURSE ANESTHETIST, CERTIFIED REGISTERED

## 2023-10-31 PROCEDURE — 0WQF4ZZ REPAIR ABDOMINAL WALL, PERCUTANEOUS ENDOSCOPIC APPROACH: ICD-10-PCS | Performed by: SURGERY

## 2023-10-31 RX ORDER — LIDOCAINE HYDROCHLORIDE 20 MG/ML
INJECTION, SOLUTION INTRAVENOUS PRN
Status: DISCONTINUED | OUTPATIENT
Start: 2023-10-31 | End: 2023-10-31 | Stop reason: SDUPTHER

## 2023-10-31 RX ORDER — MEPERIDINE HYDROCHLORIDE 25 MG/ML
12.5 INJECTION INTRAMUSCULAR; INTRAVENOUS; SUBCUTANEOUS EVERY 5 MIN PRN
Status: DISCONTINUED | OUTPATIENT
Start: 2023-10-31 | End: 2023-10-31 | Stop reason: HOSPADM

## 2023-10-31 RX ORDER — BUPIVACAINE HYDROCHLORIDE 5 MG/ML
INJECTION, SOLUTION EPIDURAL; INTRACAUDAL
Status: COMPLETED | OUTPATIENT
Start: 2023-10-31 | End: 2023-10-31

## 2023-10-31 RX ORDER — MIDAZOLAM HYDROCHLORIDE 1 MG/ML
INJECTION INTRAMUSCULAR; INTRAVENOUS PRN
Status: DISCONTINUED | OUTPATIENT
Start: 2023-10-31 | End: 2023-10-31 | Stop reason: SDUPTHER

## 2023-10-31 RX ORDER — BUDESONIDE AND FORMOTEROL FUMARATE DIHYDRATE 160; 4.5 UG/1; UG/1
2 AEROSOL RESPIRATORY (INHALATION)
Status: DISCONTINUED | OUTPATIENT
Start: 2023-10-31 | End: 2023-11-03 | Stop reason: HOSPADM

## 2023-10-31 RX ORDER — SODIUM CHLORIDE, SODIUM LACTATE, POTASSIUM CHLORIDE, CALCIUM CHLORIDE 600; 310; 30; 20 MG/100ML; MG/100ML; MG/100ML; MG/100ML
INJECTION, SOLUTION INTRAVENOUS CONTINUOUS
Status: DISCONTINUED | OUTPATIENT
Start: 2023-10-31 | End: 2023-10-31 | Stop reason: HOSPADM

## 2023-10-31 RX ORDER — LABETALOL HYDROCHLORIDE 5 MG/ML
10 INJECTION, SOLUTION INTRAVENOUS
Status: DISCONTINUED | OUTPATIENT
Start: 2023-10-31 | End: 2023-10-31 | Stop reason: HOSPADM

## 2023-10-31 RX ORDER — FENTANYL CITRATE 50 UG/ML
INJECTION, SOLUTION INTRAMUSCULAR; INTRAVENOUS PRN
Status: DISCONTINUED | OUTPATIENT
Start: 2023-10-31 | End: 2023-10-31 | Stop reason: SDUPTHER

## 2023-10-31 RX ORDER — PROPOFOL 10 MG/ML
INJECTION, EMULSION INTRAVENOUS PRN
Status: DISCONTINUED | OUTPATIENT
Start: 2023-10-31 | End: 2023-10-31 | Stop reason: SDUPTHER

## 2023-10-31 RX ORDER — SODIUM CHLORIDE 0.9 % (FLUSH) 0.9 %
5-40 SYRINGE (ML) INJECTION EVERY 12 HOURS SCHEDULED
Status: DISCONTINUED | OUTPATIENT
Start: 2023-10-31 | End: 2023-11-03 | Stop reason: HOSPADM

## 2023-10-31 RX ORDER — SODIUM CHLORIDE 0.9 % (FLUSH) 0.9 %
5-40 SYRINGE (ML) INJECTION EVERY 12 HOURS SCHEDULED
Status: DISCONTINUED | OUTPATIENT
Start: 2023-10-31 | End: 2023-10-31 | Stop reason: HOSPADM

## 2023-10-31 RX ORDER — SODIUM CHLORIDE 9 MG/ML
INJECTION, SOLUTION INTRAVENOUS PRN
Status: DISCONTINUED | OUTPATIENT
Start: 2023-10-31 | End: 2023-11-03 | Stop reason: HOSPADM

## 2023-10-31 RX ORDER — ALBUTEROL SULFATE 90 UG/1
1 AEROSOL, METERED RESPIRATORY (INHALATION) EVERY 6 HOURS PRN
Status: DISCONTINUED | OUTPATIENT
Start: 2023-10-31 | End: 2023-11-03 | Stop reason: HOSPADM

## 2023-10-31 RX ORDER — METRONIDAZOLE 500 MG/100ML
INJECTION, SOLUTION INTRAVENOUS PRN
Status: DISCONTINUED | OUTPATIENT
Start: 2023-10-31 | End: 2023-10-31 | Stop reason: SDUPTHER

## 2023-10-31 RX ORDER — ONDANSETRON 2 MG/ML
4 INJECTION INTRAMUSCULAR; INTRAVENOUS EVERY 6 HOURS PRN
Status: DISCONTINUED | OUTPATIENT
Start: 2023-10-31 | End: 2023-11-03 | Stop reason: HOSPADM

## 2023-10-31 RX ORDER — FENTANYL CITRATE 50 UG/ML
50 INJECTION, SOLUTION INTRAMUSCULAR; INTRAVENOUS EVERY 5 MIN PRN
Status: DISCONTINUED | OUTPATIENT
Start: 2023-10-31 | End: 2023-10-31 | Stop reason: HOSPADM

## 2023-10-31 RX ORDER — EPHEDRINE SULFATE 50 MG/ML
INJECTION INTRAVENOUS PRN
Status: DISCONTINUED | OUTPATIENT
Start: 2023-10-31 | End: 2023-10-31 | Stop reason: SDUPTHER

## 2023-10-31 RX ORDER — SODIUM CHLORIDE 0.9 % (FLUSH) 0.9 %
5-40 SYRINGE (ML) INJECTION PRN
Status: DISCONTINUED | OUTPATIENT
Start: 2023-10-31 | End: 2023-11-03 | Stop reason: HOSPADM

## 2023-10-31 RX ORDER — HYDRALAZINE HYDROCHLORIDE 20 MG/ML
10 INJECTION INTRAMUSCULAR; INTRAVENOUS
Status: DISCONTINUED | OUTPATIENT
Start: 2023-10-31 | End: 2023-10-31 | Stop reason: HOSPADM

## 2023-10-31 RX ORDER — LEVOTHYROXINE SODIUM 0.1 MG/1
100 TABLET ORAL DAILY
Status: DISCONTINUED | OUTPATIENT
Start: 2023-10-31 | End: 2023-11-03 | Stop reason: HOSPADM

## 2023-10-31 RX ORDER — OXYCODONE HYDROCHLORIDE 5 MG/1
5 TABLET ORAL EVERY 4 HOURS PRN
Status: DISCONTINUED | OUTPATIENT
Start: 2023-10-31 | End: 2023-11-03 | Stop reason: HOSPADM

## 2023-10-31 RX ORDER — GABAPENTIN 300 MG/1
600 CAPSULE ORAL
Status: DISCONTINUED | OUTPATIENT
Start: 2023-10-31 | End: 2023-11-03 | Stop reason: HOSPADM

## 2023-10-31 RX ORDER — MIDODRINE HYDROCHLORIDE 5 MG/1
5 TABLET ORAL 3 TIMES DAILY
Status: DISCONTINUED | OUTPATIENT
Start: 2023-10-31 | End: 2023-11-03 | Stop reason: HOSPADM

## 2023-10-31 RX ORDER — SODIUM CHLORIDE 0.9 % (FLUSH) 0.9 %
5-40 SYRINGE (ML) INJECTION PRN
Status: DISCONTINUED | OUTPATIENT
Start: 2023-10-31 | End: 2023-10-31 | Stop reason: HOSPADM

## 2023-10-31 RX ORDER — CYCLOBENZAPRINE HCL 10 MG
10 TABLET ORAL 3 TIMES DAILY PRN
Status: DISCONTINUED | OUTPATIENT
Start: 2023-10-31 | End: 2023-11-03 | Stop reason: HOSPADM

## 2023-10-31 RX ORDER — DROPERIDOL 2.5 MG/ML
0.62 INJECTION, SOLUTION INTRAMUSCULAR; INTRAVENOUS
Status: DISCONTINUED | OUTPATIENT
Start: 2023-10-31 | End: 2023-10-31 | Stop reason: HOSPADM

## 2023-10-31 RX ORDER — GABAPENTIN 100 MG/1
200 CAPSULE ORAL 2 TIMES DAILY
Status: DISCONTINUED | OUTPATIENT
Start: 2023-10-31 | End: 2023-11-03 | Stop reason: HOSPADM

## 2023-10-31 RX ORDER — SODIUM CHLORIDE 9 MG/ML
INJECTION, SOLUTION INTRAVENOUS PRN
Status: DISCONTINUED | OUTPATIENT
Start: 2023-10-31 | End: 2023-10-31 | Stop reason: HOSPADM

## 2023-10-31 RX ORDER — PHENYLEPHRINE HCL IN 0.9% NACL 1 MG/10 ML
SYRINGE (ML) INTRAVENOUS PRN
Status: DISCONTINUED | OUTPATIENT
Start: 2023-10-31 | End: 2023-10-31 | Stop reason: SDUPTHER

## 2023-10-31 RX ORDER — ROCURONIUM BROMIDE 10 MG/ML
INJECTION, SOLUTION INTRAVENOUS PRN
Status: DISCONTINUED | OUTPATIENT
Start: 2023-10-31 | End: 2023-10-31 | Stop reason: SDUPTHER

## 2023-10-31 RX ORDER — OXYCODONE HYDROCHLORIDE 5 MG/1
5 TABLET ORAL
Status: DISCONTINUED | OUTPATIENT
Start: 2023-10-31 | End: 2023-10-31 | Stop reason: HOSPADM

## 2023-10-31 RX ORDER — SODIUM CHLORIDE 9 MG/ML
INJECTION, SOLUTION INTRAVENOUS CONTINUOUS
Status: DISCONTINUED | OUTPATIENT
Start: 2023-10-31 | End: 2023-11-01

## 2023-10-31 RX ORDER — ENOXAPARIN SODIUM 100 MG/ML
40 INJECTION SUBCUTANEOUS DAILY
Status: DISCONTINUED | OUTPATIENT
Start: 2023-10-31 | End: 2023-11-03 | Stop reason: HOSPADM

## 2023-10-31 RX ORDER — ONDANSETRON 2 MG/ML
4 INJECTION INTRAMUSCULAR; INTRAVENOUS
Status: DISCONTINUED | OUTPATIENT
Start: 2023-10-31 | End: 2023-10-31 | Stop reason: HOSPADM

## 2023-10-31 RX ORDER — METRONIDAZOLE 500 MG/100ML
500 INJECTION, SOLUTION INTRAVENOUS ONCE
Status: COMPLETED | OUTPATIENT
Start: 2023-10-31 | End: 2023-10-31

## 2023-10-31 RX ORDER — DEXAMETHASONE SODIUM PHOSPHATE 4 MG/ML
INJECTION, SOLUTION INTRA-ARTICULAR; INTRALESIONAL; INTRAMUSCULAR; INTRAVENOUS; SOFT TISSUE PRN
Status: DISCONTINUED | OUTPATIENT
Start: 2023-10-31 | End: 2023-10-31 | Stop reason: SDUPTHER

## 2023-10-31 RX ORDER — ONDANSETRON 4 MG/1
4 TABLET, ORALLY DISINTEGRATING ORAL EVERY 8 HOURS PRN
Status: DISCONTINUED | OUTPATIENT
Start: 2023-10-31 | End: 2023-11-03 | Stop reason: HOSPADM

## 2023-10-31 RX ORDER — ONDANSETRON 2 MG/ML
INJECTION INTRAMUSCULAR; INTRAVENOUS PRN
Status: DISCONTINUED | OUTPATIENT
Start: 2023-10-31 | End: 2023-10-31 | Stop reason: SDUPTHER

## 2023-10-31 RX ORDER — TOPIRAMATE 25 MG/1
25 TABLET ORAL
Status: DISCONTINUED | OUTPATIENT
Start: 2023-10-31 | End: 2023-11-03 | Stop reason: HOSPADM

## 2023-10-31 RX ADMIN — FENTANYL CITRATE 50 MCG: 50 INJECTION INTRAMUSCULAR; INTRAVENOUS at 14:01

## 2023-10-31 RX ADMIN — Medication 0.2 MG: at 10:23

## 2023-10-31 RX ADMIN — EPHEDRINE SULFATE 10 MG: 50 INJECTION INTRAVENOUS at 10:17

## 2023-10-31 RX ADMIN — ENOXAPARIN SODIUM 40 MG: 100 INJECTION SUBCUTANEOUS at 16:24

## 2023-10-31 RX ADMIN — ROCURONIUM BROMIDE 10 MG: 10 INJECTION, SOLUTION INTRAVENOUS at 11:30

## 2023-10-31 RX ADMIN — SODIUM CHLORIDE: 9 INJECTION, SOLUTION INTRAVENOUS at 16:25

## 2023-10-31 RX ADMIN — OXYCODONE HYDROCHLORIDE 5 MG: 5 TABLET ORAL at 16:24

## 2023-10-31 RX ADMIN — METRONIDAZOLE 500 MG: 500 INJECTION, SOLUTION INTRAVENOUS at 08:34

## 2023-10-31 RX ADMIN — OXYCODONE HYDROCHLORIDE 5 MG: 5 TABLET ORAL at 20:11

## 2023-10-31 RX ADMIN — ROCURONIUM BROMIDE 20 MG: 10 INJECTION, SOLUTION INTRAVENOUS at 11:00

## 2023-10-31 RX ADMIN — HYDROMORPHONE HYDROCHLORIDE 1 MG: 1 INJECTION, SOLUTION INTRAMUSCULAR; INTRAVENOUS; SUBCUTANEOUS at 11:40

## 2023-10-31 RX ADMIN — SODIUM CHLORIDE, POTASSIUM CHLORIDE, SODIUM LACTATE AND CALCIUM CHLORIDE: 600; 310; 30; 20 INJECTION, SOLUTION INTRAVENOUS at 08:20

## 2023-10-31 RX ADMIN — GABAPENTIN 200 MG: 100 CAPSULE ORAL at 16:24

## 2023-10-31 RX ADMIN — METRONIDAZOLE 500 MG: 5 INJECTION, SOLUTION INTRAVENOUS at 10:13

## 2023-10-31 RX ADMIN — CEFAZOLIN 2000 MG: 2 INJECTION, POWDER, FOR SOLUTION INTRAMUSCULAR; INTRAVENOUS at 08:34

## 2023-10-31 RX ADMIN — SUGAMMADEX 200 MG: 100 INJECTION, SOLUTION INTRAVENOUS at 13:27

## 2023-10-31 RX ADMIN — HYDROMORPHONE HYDROCHLORIDE 0.25 MG: 1 INJECTION, SOLUTION INTRAMUSCULAR; INTRAVENOUS; SUBCUTANEOUS at 14:19

## 2023-10-31 RX ADMIN — SODIUM CHLORIDE, POTASSIUM CHLORIDE, SODIUM LACTATE AND CALCIUM CHLORIDE: 600; 310; 30; 20 INJECTION, SOLUTION INTRAVENOUS at 12:36

## 2023-10-31 RX ADMIN — Medication 0.1 MG: at 10:10

## 2023-10-31 RX ADMIN — PROPOFOL 150 MG: 10 INJECTION, EMULSION INTRAVENOUS at 10:04

## 2023-10-31 RX ADMIN — TOPIRAMATE 25 MG: 25 TABLET, FILM COATED ORAL at 20:08

## 2023-10-31 RX ADMIN — MIDAZOLAM 2 MG: 1 INJECTION INTRAMUSCULAR; INTRAVENOUS at 09:58

## 2023-10-31 RX ADMIN — Medication 0.2 MG: at 10:14

## 2023-10-31 RX ADMIN — LIDOCAINE HYDROCHLORIDE 100 MG: 20 INJECTION, SOLUTION INTRAVENOUS at 10:04

## 2023-10-31 RX ADMIN — ROCURONIUM BROMIDE 50 MG: 10 INJECTION, SOLUTION INTRAVENOUS at 10:04

## 2023-10-31 RX ADMIN — ROCURONIUM BROMIDE 10 MG: 10 INJECTION, SOLUTION INTRAVENOUS at 12:30

## 2023-10-31 RX ADMIN — EPHEDRINE SULFATE 10 MG: 50 INJECTION INTRAVENOUS at 10:28

## 2023-10-31 RX ADMIN — GABAPENTIN 600 MG: 300 CAPSULE ORAL at 20:09

## 2023-10-31 RX ADMIN — CEFAZOLIN 2000 MG: 2 INJECTION, POWDER, FOR SOLUTION INTRAMUSCULAR; INTRAVENOUS at 10:10

## 2023-10-31 RX ADMIN — FENTANYL CITRATE 50 MCG: 50 INJECTION INTRAMUSCULAR; INTRAVENOUS at 13:54

## 2023-10-31 RX ADMIN — FENTANYL CITRATE 100 MCG: 50 INJECTION, SOLUTION INTRAMUSCULAR; INTRAVENOUS at 09:58

## 2023-10-31 RX ADMIN — Medication 0.2 MG: at 10:17

## 2023-10-31 RX ADMIN — HYDROMORPHONE HYDROCHLORIDE 1 MG: 1 INJECTION, SOLUTION INTRAMUSCULAR; INTRAVENOUS; SUBCUTANEOUS at 18:03

## 2023-10-31 RX ADMIN — ONDANSETRON 4 MG: 2 INJECTION INTRAMUSCULAR; INTRAVENOUS at 13:19

## 2023-10-31 RX ADMIN — DEXAMETHASONE SODIUM PHOSPHATE 8 MG: 4 INJECTION INTRA-ARTICULAR; INTRALESIONAL; INTRAMUSCULAR; INTRAVENOUS; SOFT TISSUE at 10:11

## 2023-10-31 ASSESSMENT — PAIN SCALES - GENERAL
PAINLEVEL_OUTOF10: 8
PAINLEVEL_OUTOF10: 7
PAINLEVEL_OUTOF10: 10
PAINLEVEL_OUTOF10: 7
PAINLEVEL_OUTOF10: 3
PAINLEVEL_OUTOF10: 7
PAINLEVEL_OUTOF10: 10

## 2023-10-31 ASSESSMENT — PAIN SCALES - WONG BAKER: WONGBAKER_NUMERICALRESPONSE: 0

## 2023-10-31 ASSESSMENT — PAIN - FUNCTIONAL ASSESSMENT
PAIN_FUNCTIONAL_ASSESSMENT: PREVENTS OR INTERFERES SOME ACTIVE ACTIVITIES AND ADLS
PAIN_FUNCTIONAL_ASSESSMENT: ACTIVITIES ARE NOT PREVENTED
PAIN_FUNCTIONAL_ASSESSMENT: ACTIVITIES ARE NOT PREVENTED
PAIN_FUNCTIONAL_ASSESSMENT: 0-10
PAIN_FUNCTIONAL_ASSESSMENT: ACTIVITIES ARE NOT PREVENTED

## 2023-10-31 ASSESSMENT — PAIN DESCRIPTION - ORIENTATION
ORIENTATION: MID

## 2023-10-31 ASSESSMENT — ENCOUNTER SYMPTOMS: SHORTNESS OF BREATH: 1

## 2023-10-31 ASSESSMENT — PAIN DESCRIPTION - LOCATION
LOCATION: ABDOMEN

## 2023-10-31 ASSESSMENT — PAIN DESCRIPTION - DESCRIPTORS
DESCRIPTORS: ACHING;DISCOMFORT;CRAMPING
DESCRIPTORS: BURNING
DESCRIPTORS: ACHING;DISCOMFORT
DESCRIPTORS: BURNING

## 2023-10-31 ASSESSMENT — PAIN DESCRIPTION - PAIN TYPE: TYPE: SURGICAL PAIN

## 2023-10-31 NOTE — PROGRESS NOTES
4 Eyes Skin Assessment     NAME:  Sebastian Pollock  YOB: 1975  MEDICAL RECORD NUMBER:  [id number]    The patient is being assessed for  Admission    I agree that at least one RN has performed a thorough Head to Toe Skin Assessment on the patient. ALL assessment sites listed below have been assessed. Areas assessed by both nurses:    Head, Face, Ears, Shoulders, Back, Chest, Arms, Elbows, Hands, Sacrum. Buttock, Coccyx, Ischium, Legs. Feet and Heels, and Under Medical Devices         Does the Patient have a Wound?  No noted wound(s)       Hu Prevention initiated by RN: No  Wound Care Orders initiated by RN: No    Pressure Injury (Stage 3,4, Unstageable, DTI, NWPT, and Complex wounds) if present, place Wound referral order by RN under : No    New Ostomies, if present place, Ostomy referral order under : No     Nurse 1 eSignature: Electronically signed by Mandy Khan LPN on 49/86/84 at 8:90 PM EDT    **SHARE this note so that the co-signing nurse can place an eSignature**    Nurse 2 eSignature: {Esignature:[id number]}
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
## 2023-10-31 NOTE — ANESTHESIA POSTPROCEDURE EVALUATION
Department of Anesthesiology  Postprocedure Note    Patient: Chi Carney  MRN: [id number]  YOB: 1975  Date of evaluation: 10/31/2023      Procedure Summary     Date: 10/31/23 Room / Location: [address] [place] [address]    Anesthesia Start: 4373 Anesthesia Stop: 5044    Procedure: ROBOTIC COLOSTOMY REVERSAL Diagnosis:       Attention to colostomy [facility])      ([facility] [Z43.3])    Surgeons: Libertad Vaz MD Responsible Provider: Araceli Garrido MD    Anesthesia Type: MAC ASA Status: 3          Anesthesia Type: No value filed.     Sabi Phase I: Sabi Score: 9    Sabi Phase II:        Anesthesia Post Evaluation    Patient location during evaluation: PACU  Patient participation: complete - patient participated  Level of consciousness: awake  Pain score: 1  Airway patency: patent  Nausea & Vomiting: no nausea  Complications: no  Cardiovascular status: hemodynamically stable  Respiratory status: nasal cannula  Hydration status: euvolemic  Multimodal analgesia pain management approach

## 2023-11-01 PROBLEM — Z01.818 PRE-OP TESTING: Status: ACTIVE | Noted: 2022-11-12

## 2023-11-01 PROCEDURE — 85025 COMPLETE CBC W/AUTO DIFF WBC: CPT

## 2023-11-01 PROCEDURE — 80053 COMPREHEN METABOLIC PANEL: CPT

## 2023-11-01 PROCEDURE — 76937 US GUIDE VASCULAR ACCESS: CPT

## 2023-11-01 PROCEDURE — 2580000003 HC RX 258: Performed by: PHYSICIAN ASSISTANT

## 2023-11-01 PROCEDURE — 6360000002 HC RX W HCPCS: Performed by: PHYSICIAN ASSISTANT

## 2023-11-01 PROCEDURE — 6370000000 HC RX 637 (ALT 250 FOR IP): Performed by: PHYSICIAN ASSISTANT

## 2023-11-01 PROCEDURE — 94150 VITAL CAPACITY TEST: CPT

## 2023-11-01 PROCEDURE — 94761 N-INVAS EAR/PLS OXIMETRY MLT: CPT

## 2023-11-01 PROCEDURE — 94664 DEMO&/EVAL PT USE INHALER: CPT

## 2023-11-01 PROCEDURE — 1200000000 HC SEMI PRIVATE

## 2023-11-01 PROCEDURE — 94640 AIRWAY INHALATION TREATMENT: CPT

## 2023-11-01 RX ADMIN — OXYCODONE HYDROCHLORIDE 5 MG: 5 TABLET ORAL at 01:44

## 2023-11-01 RX ADMIN — SODIUM CHLORIDE, PRESERVATIVE FREE 10 ML: 5 INJECTION INTRAVENOUS at 21:36

## 2023-11-01 RX ADMIN — HYDROMORPHONE HYDROCHLORIDE 1 MG: 1 INJECTION, SOLUTION INTRAMUSCULAR; INTRAVENOUS; SUBCUTANEOUS at 09:08

## 2023-11-01 RX ADMIN — BUDESONIDE AND FORMOTEROL FUMARATE DIHYDRATE 2 PUFF: 160; 4.5 AEROSOL RESPIRATORY (INHALATION) at 08:40

## 2023-11-01 RX ADMIN — SODIUM CHLORIDE: 9 INJECTION, SOLUTION INTRAVENOUS at 05:53

## 2023-11-01 RX ADMIN — OXYCODONE HYDROCHLORIDE 5 MG: 5 TABLET ORAL at 05:40

## 2023-11-01 RX ADMIN — ENOXAPARIN SODIUM 40 MG: 100 INJECTION SUBCUTANEOUS at 10:02

## 2023-11-01 RX ADMIN — MIDODRINE HYDROCHLORIDE 5 MG: 5 TABLET ORAL at 10:02

## 2023-11-01 RX ADMIN — MIDODRINE HYDROCHLORIDE 5 MG: 5 TABLET ORAL at 21:35

## 2023-11-01 RX ADMIN — LEVOTHYROXINE SODIUM 100 MCG: 0.1 TABLET ORAL at 05:38

## 2023-11-01 RX ADMIN — HYDROMORPHONE HYDROCHLORIDE 1 MG: 1 INJECTION, SOLUTION INTRAMUSCULAR; INTRAVENOUS; SUBCUTANEOUS at 21:38

## 2023-11-01 RX ADMIN — GABAPENTIN 600 MG: 300 CAPSULE ORAL at 21:34

## 2023-11-01 RX ADMIN — ONDANSETRON 4 MG: 2 INJECTION INTRAMUSCULAR; INTRAVENOUS at 21:38

## 2023-11-01 RX ADMIN — OXYCODONE HYDROCHLORIDE 5 MG: 5 TABLET ORAL at 17:57

## 2023-11-01 RX ADMIN — OXYCODONE HYDROCHLORIDE 5 MG: 5 TABLET ORAL at 13:35

## 2023-11-01 RX ADMIN — GABAPENTIN 200 MG: 100 CAPSULE ORAL at 13:35

## 2023-11-01 RX ADMIN — TOPIRAMATE 25 MG: 25 TABLET, FILM COATED ORAL at 21:35

## 2023-11-01 RX ADMIN — GABAPENTIN 200 MG: 100 CAPSULE ORAL at 10:02

## 2023-11-01 ASSESSMENT — PAIN DESCRIPTION - ORIENTATION
ORIENTATION: ANTERIOR
ORIENTATION: MID
ORIENTATION: ANTERIOR;MID
ORIENTATION: MID
ORIENTATION: MID

## 2023-11-01 ASSESSMENT — PAIN DESCRIPTION - LOCATION
LOCATION: ABDOMEN

## 2023-11-01 ASSESSMENT — PAIN DESCRIPTION - DESCRIPTORS
DESCRIPTORS: ACHING
DESCRIPTORS: SHARP
DESCRIPTORS: ACHING
DESCRIPTORS: SHARP
DESCRIPTORS: SHARP
DESCRIPTORS: ACHING;DISCOMFORT
DESCRIPTORS: SHARP
DESCRIPTORS: ACHING

## 2023-11-01 ASSESSMENT — PAIN SCALES - GENERAL
PAINLEVEL_OUTOF10: 6
PAINLEVEL_OUTOF10: 8
PAINLEVEL_OUTOF10: 8
PAINLEVEL_OUTOF10: 10
PAINLEVEL_OUTOF10: 6
PAINLEVEL_OUTOF10: 8
PAINLEVEL_OUTOF10: 7

## 2023-11-01 ASSESSMENT — PAIN - FUNCTIONAL ASSESSMENT
PAIN_FUNCTIONAL_ASSESSMENT: PREVENTS OR INTERFERES SOME ACTIVE ACTIVITIES AND ADLS
PAIN_FUNCTIONAL_ASSESSMENT: ACTIVITIES ARE NOT PREVENTED
PAIN_FUNCTIONAL_ASSESSMENT: ACTIVITIES ARE NOT PREVENTED
PAIN_FUNCTIONAL_ASSESSMENT: PREVENTS OR INTERFERES SOME ACTIVE ACTIVITIES AND ADLS

## 2023-11-01 ASSESSMENT — PAIN DESCRIPTION - PAIN TYPE
TYPE: SURGICAL PAIN

## 2023-11-01 ASSESSMENT — PAIN DESCRIPTION - ONSET: ONSET: ON-GOING

## 2023-11-01 ASSESSMENT — PAIN DESCRIPTION - FREQUENCY: FREQUENCY: INTERMITTENT

## 2023-11-01 NOTE — CARE COORDINATION
11/01/23 1212   Service Assessment   Patient Orientation Alert and Oriented   Cognition Alert   History Provided By Medical Record; Patient   Primary Caregiver Self   Support Systems Spouse/Significant Other   Patient's Healthcare Decision Maker is: Legal Next of Kin   PCP Verified by CM Yes   Last Visit to PCP Within last 6 months   Prior Functional Level Independent in ADLs/IADLs   Current Functional Level Independent in ADLs/IADLs  (Walked 100' in hallway with RW)   Can patient return to prior living arrangement Yes   Ability to make needs known: Good   Family able to assist with home care needs: Yes   Would you like for me to discuss the discharge plan with any other family members/significant others, and if so, who? No   Financial Resources Medicare   Community Resources ECF/Home Care  ([facility]     From home, independent prior to admission. Patient has power wheelchair, waiting on ramp and lift chair from insurance. Patient is active with [place] [facility]. Patient ambulated 100' in hallway with RW. Plan is home. CM following. Patient will need updated HC orders at [place].
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
## 2023-11-01 NOTE — PROGRESS NOTES
Patient ambulated x1 in the hallway for 100ft at 2230 with the walker and some assistance. Tolerated well. Pain is manageable with oxy at this time.

## 2023-11-02 PROCEDURE — 2580000003 HC RX 258: Performed by: PHYSICIAN ASSISTANT

## 2023-11-02 PROCEDURE — 6370000000 HC RX 637 (ALT 250 FOR IP): Performed by: PHYSICIAN ASSISTANT

## 2023-11-02 PROCEDURE — 6370000000 HC RX 637 (ALT 250 FOR IP): Performed by: SURGERY

## 2023-11-02 PROCEDURE — 6360000002 HC RX W HCPCS: Performed by: PHYSICIAN ASSISTANT

## 2023-11-02 PROCEDURE — 99024 POSTOP FOLLOW-UP VISIT: CPT | Performed by: SURGERY

## 2023-11-02 PROCEDURE — 94761 N-INVAS EAR/PLS OXIMETRY MLT: CPT

## 2023-11-02 PROCEDURE — 94150 VITAL CAPACITY TEST: CPT

## 2023-11-02 PROCEDURE — 94640 AIRWAY INHALATION TREATMENT: CPT

## 2023-11-02 PROCEDURE — 1200000000 HC SEMI PRIVATE

## 2023-11-02 RX ADMIN — ENOXAPARIN SODIUM 40 MG: 100 INJECTION SUBCUTANEOUS at 08:52

## 2023-11-02 RX ADMIN — HYDROMORPHONE HYDROCHLORIDE 1 MG: 1 INJECTION, SOLUTION INTRAMUSCULAR; INTRAVENOUS; SUBCUTANEOUS at 18:23

## 2023-11-02 RX ADMIN — OXYCODONE HYDROCHLORIDE 5 MG: 5 TABLET ORAL at 17:02

## 2023-11-02 RX ADMIN — MIDODRINE HYDROCHLORIDE 5 MG: 5 TABLET ORAL at 08:52

## 2023-11-02 RX ADMIN — SODIUM CHLORIDE, PRESERVATIVE FREE 10 ML: 5 INJECTION INTRAVENOUS at 08:52

## 2023-11-02 RX ADMIN — GABAPENTIN 200 MG: 100 CAPSULE ORAL at 08:52

## 2023-11-02 RX ADMIN — HYDROMORPHONE HYDROCHLORIDE 1 MG: 1 INJECTION, SOLUTION INTRAMUSCULAR; INTRAVENOUS; SUBCUTANEOUS at 03:48

## 2023-11-02 RX ADMIN — OXYCODONE HYDROCHLORIDE 5 MG: 5 TABLET ORAL at 22:12

## 2023-11-02 RX ADMIN — GABAPENTIN 600 MG: 300 CAPSULE ORAL at 22:11

## 2023-11-02 RX ADMIN — LEVOTHYROXINE SODIUM 100 MCG: 0.1 TABLET ORAL at 06:07

## 2023-11-02 RX ADMIN — SODIUM CHLORIDE, PRESERVATIVE FREE 10 ML: 5 INJECTION INTRAVENOUS at 22:12

## 2023-11-02 RX ADMIN — METOPROLOL TARTRATE 25 MG: 25 TABLET, FILM COATED ORAL at 13:14

## 2023-11-02 RX ADMIN — GABAPENTIN 200 MG: 100 CAPSULE ORAL at 13:17

## 2023-11-02 RX ADMIN — TOPIRAMATE 25 MG: 25 TABLET, FILM COATED ORAL at 22:11

## 2023-11-02 RX ADMIN — BUDESONIDE AND FORMOTEROL FUMARATE DIHYDRATE 2 PUFF: 160; 4.5 AEROSOL RESPIRATORY (INHALATION) at 08:27

## 2023-11-02 RX ADMIN — OXYCODONE HYDROCHLORIDE 5 MG: 5 TABLET ORAL at 08:52

## 2023-11-02 RX ADMIN — HYDROMORPHONE HYDROCHLORIDE 1 MG: 1 INJECTION, SOLUTION INTRAMUSCULAR; INTRAVENOUS; SUBCUTANEOUS at 12:50

## 2023-11-02 ASSESSMENT — PAIN SCALES - WONG BAKER: WONGBAKER_NUMERICALRESPONSE: 0

## 2023-11-02 ASSESSMENT — PAIN DESCRIPTION - DESCRIPTORS
DESCRIPTORS: ACHING;BURNING
DESCRIPTORS: ACHING;BURNING
DESCRIPTORS: STABBING;TIGHTNESS
DESCRIPTORS: ACHING
DESCRIPTORS: CRAMPING
DESCRIPTORS: ACHING

## 2023-11-02 ASSESSMENT — PAIN DESCRIPTION - ORIENTATION
ORIENTATION: MID
ORIENTATION: LEFT
ORIENTATION: MID
ORIENTATION: MID
ORIENTATION: LEFT;LOWER

## 2023-11-02 ASSESSMENT — PAIN DESCRIPTION - LOCATION
LOCATION: ABDOMEN

## 2023-11-02 ASSESSMENT — PAIN SCALES - GENERAL
PAINLEVEL_OUTOF10: 6
PAINLEVEL_OUTOF10: 10
PAINLEVEL_OUTOF10: 8
PAINLEVEL_OUTOF10: 7
PAINLEVEL_OUTOF10: 7

## 2023-11-02 ASSESSMENT — PAIN - FUNCTIONAL ASSESSMENT
PAIN_FUNCTIONAL_ASSESSMENT: ACTIVITIES ARE NOT PREVENTED
PAIN_FUNCTIONAL_ASSESSMENT: PREVENTS OR INTERFERES SOME ACTIVE ACTIVITIES AND ADLS
PAIN_FUNCTIONAL_ASSESSMENT: PREVENTS OR INTERFERES SOME ACTIVE ACTIVITIES AND ADLS

## 2023-11-02 ASSESSMENT — PAIN DESCRIPTION - PAIN TYPE: TYPE: SURGICAL PAIN

## 2023-11-02 ASSESSMENT — PAIN DESCRIPTION - ONSET: ONSET: ON-GOING
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
## 2023-11-03 VITALS
HEIGHT: 66 IN | RESPIRATION RATE: 16 BRPM | BODY MASS INDEX: 22.32 KG/M2 | DIASTOLIC BLOOD PRESSURE: 80 MMHG | SYSTOLIC BLOOD PRESSURE: 110 MMHG | TEMPERATURE: 98.6 F | WEIGHT: 138.9 LBS | HEART RATE: 95 BPM | OXYGEN SATURATION: 94 %

## 2023-11-03 PROCEDURE — 94761 N-INVAS EAR/PLS OXIMETRY MLT: CPT

## 2023-11-03 PROCEDURE — 6360000002 HC RX W HCPCS: Performed by: PHYSICIAN ASSISTANT

## 2023-11-03 PROCEDURE — 6370000000 HC RX 637 (ALT 250 FOR IP): Performed by: SURGERY

## 2023-11-03 PROCEDURE — 2580000003 HC RX 258: Performed by: PHYSICIAN ASSISTANT

## 2023-11-03 PROCEDURE — 94010 BREATHING CAPACITY TEST: CPT

## 2023-11-03 PROCEDURE — 94664 DEMO&/EVAL PT USE INHALER: CPT

## 2023-11-03 PROCEDURE — 94150 VITAL CAPACITY TEST: CPT

## 2023-11-03 PROCEDURE — 6370000000 HC RX 637 (ALT 250 FOR IP): Performed by: PHYSICIAN ASSISTANT

## 2023-11-03 PROCEDURE — 94640 AIRWAY INHALATION TREATMENT: CPT

## 2023-11-03 PROCEDURE — 89220 SPUTUM SPECIMEN COLLECTION: CPT

## 2023-11-03 RX ORDER — OXYCODONE HYDROCHLORIDE AND ACETAMINOPHEN 10; 300 MG/5ML; MG/5ML
1 SOLUTION ORAL EVERY 6 HOURS PRN
Qty: 20 ML | Refills: 0 | Status: SHIPPED | OUTPATIENT
Start: 2023-11-03 | End: 2023-11-03 | Stop reason: SDUPTHER

## 2023-11-03 RX ORDER — CYCLOBENZAPRINE HCL 10 MG
10 TABLET ORAL 3 TIMES DAILY PRN
Qty: 30 TABLET | Refills: 0 | Status: SHIPPED | OUTPATIENT
Start: 2023-11-03 | End: 2023-11-03 | Stop reason: SDUPTHER

## 2023-11-03 RX ORDER — OXYCODONE HYDROCHLORIDE AND ACETAMINOPHEN 10; 300 MG/5ML; MG/5ML
1 SOLUTION ORAL EVERY 6 HOURS PRN
Qty: 20 ML | Refills: 0 | Status: SHIPPED | OUTPATIENT
Start: 2023-11-03 | End: 2023-11-10

## 2023-11-03 RX ORDER — CYCLOBENZAPRINE HCL 10 MG
10 TABLET ORAL 3 TIMES DAILY PRN
Qty: 30 TABLET | Refills: 0 | Status: SHIPPED | OUTPATIENT
Start: 2023-11-03 | End: 2023-11-13

## 2023-11-03 RX ADMIN — ENOXAPARIN SODIUM 40 MG: 100 INJECTION SUBCUTANEOUS at 08:57

## 2023-11-03 RX ADMIN — CYCLOBENZAPRINE 10 MG: 10 TABLET, FILM COATED ORAL at 14:12

## 2023-11-03 RX ADMIN — METOPROLOL TARTRATE 25 MG: 25 TABLET, FILM COATED ORAL at 08:57

## 2023-11-03 RX ADMIN — GABAPENTIN 200 MG: 100 CAPSULE ORAL at 14:09

## 2023-11-03 RX ADMIN — MIDODRINE HYDROCHLORIDE 5 MG: 5 TABLET ORAL at 14:08

## 2023-11-03 RX ADMIN — OXYCODONE HYDROCHLORIDE 5 MG: 5 TABLET ORAL at 11:22

## 2023-11-03 RX ADMIN — MIDODRINE HYDROCHLORIDE 5 MG: 5 TABLET ORAL at 08:58

## 2023-11-03 RX ADMIN — SODIUM CHLORIDE, PRESERVATIVE FREE 10 ML: 5 INJECTION INTRAVENOUS at 08:59

## 2023-11-03 RX ADMIN — OXYCODONE HYDROCHLORIDE 5 MG: 5 TABLET ORAL at 06:45

## 2023-11-03 RX ADMIN — ONDANSETRON 4 MG: 4 TABLET, ORALLY DISINTEGRATING ORAL at 11:22

## 2023-11-03 RX ADMIN — OXYCODONE HYDROCHLORIDE 5 MG: 5 TABLET ORAL at 02:42

## 2023-11-03 RX ADMIN — GABAPENTIN 200 MG: 100 CAPSULE ORAL at 08:57

## 2023-11-03 RX ADMIN — LEVOTHYROXINE SODIUM 100 MCG: 0.1 TABLET ORAL at 06:43

## 2023-11-03 RX ADMIN — HYDROMORPHONE HYDROCHLORIDE 1 MG: 1 INJECTION, SOLUTION INTRAMUSCULAR; INTRAVENOUS; SUBCUTANEOUS at 04:11

## 2023-11-03 RX ADMIN — BUDESONIDE AND FORMOTEROL FUMARATE DIHYDRATE 2 PUFF: 160; 4.5 AEROSOL RESPIRATORY (INHALATION) at 08:17

## 2023-11-03 ASSESSMENT — COPD QUESTIONNAIRES
QUESTION6_LEAVINGHOUSE: 0
QUESTION3_CHESTTIGHTNESS: 0
QUESTION5_HOMEACTIVITIES: 0
QUESTION8_ENERGYLEVEL: 3
TOTAL_EXACERBATIONS_PASTYEAR: 0
GOLD_GROUP: GROUP B
QUESTION7_SLEEPQUALITY: 0
GOLD_GRADE: 3
CAT_TOTALSCORE: 6
QUESTION4_WALKINCLINE: 3
QUESTION1_COUGHFREQUENCY: 0
QUESTION2_CHESTPHLEGM: 0

## 2023-11-03 ASSESSMENT — PAIN DESCRIPTION - LOCATION
LOCATION: ABDOMEN

## 2023-11-03 ASSESSMENT — PAIN DESCRIPTION - FREQUENCY
FREQUENCY: INTERMITTENT
FREQUENCY: INTERMITTENT

## 2023-11-03 ASSESSMENT — PAIN DESCRIPTION - DESCRIPTORS
DESCRIPTORS: ACHING
DESCRIPTORS: ACHING;DISCOMFORT
DESCRIPTORS: ACHING;DISCOMFORT

## 2023-11-03 ASSESSMENT — PAIN SCALES - GENERAL
PAINLEVEL_OUTOF10: 10
PAINLEVEL_OUTOF10: 3
PAINLEVEL_OUTOF10: 9
PAINLEVEL_OUTOF10: 7
PAINLEVEL_OUTOF10: 7
PAINLEVEL_OUTOF10: 10

## 2023-11-03 ASSESSMENT — PAIN DESCRIPTION - PAIN TYPE
TYPE: SURGICAL PAIN

## 2023-11-03 ASSESSMENT — PAIN DESCRIPTION - ORIENTATION
ORIENTATION: LEFT

## 2023-11-03 ASSESSMENT — PAIN DESCRIPTION - ONSET
ONSET: ON-GOING
ONSET: ON-GOING

## 2023-11-03 ASSESSMENT — PAIN - FUNCTIONAL ASSESSMENT
PAIN_FUNCTIONAL_ASSESSMENT: PREVENTS OR INTERFERES SOME ACTIVE ACTIVITIES AND ADLS
PAIN_FUNCTIONAL_ASSESSMENT: PREVENTS OR INTERFERES SOME ACTIVE ACTIVITIES AND ADLS

## 2023-11-03 ASSESSMENT — PULMONARY FUNCTION TESTS
POST BRONCHODILATOR FEV1/FVC: 46
FEV1 (%PREDICTED): 33
PIF_VALUE: 120

## 2023-11-03 NOTE — DISCHARGE INSTRUCTIONS
Patient Discharge Instructions  Dr. Mai Carpenter  [phone]    Discharge Date:  11/3/2023    Discharged To: Home      RESUME ACTIVITY:      BATHING:   OK to shower but no bath tub or submerging incision under water    Wound:    Keep wound dry and clean. May shower as instructed above. Dressing:    Change outer dressing daily after shower or if soiled. DRIVING:   3-5 days. No driving until off narcotic pain medications and     walking comfortably    RETURN TO WORK: when cleared after your follow up office visit    WALKING:    As tolerated     STAIRS:    As tolerated    LIFTING:   Less than 10 pounds for 2 weeks    DIET:    Regular diet as tolerated    SPECIAL INSTRUCTIONS:     Call the office at [phone]  if you have a fever greater than or equal to 101 oF or if your incision becomes red, tender, or has drainage of pus. If follow up appointment was not given to you, call the Surgical Clinic at [phone] for follow up appointment with Dr. Murphy in:  1-2 weeks.
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
## 2023-11-03 NOTE — PROGRESS NOTES
Patient discharged with discharge instructions and belongings. Left via wheelchair to private vehicle.
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
## 2023-11-06 ENCOUNTER — TELEPHONE (OUTPATIENT)
Dept: FAMILY MEDICINE CLINIC | Age: 48
End: 2023-11-06

## 2023-11-06 NOTE — TELEPHONE ENCOUNTER
Care Transitions Initial Follow Up Call    Outreach made within 2 business days of discharge: Yes    Patient: Jacek Hungarian Patient : 1975   MRN: [id number]  Reason for Admission: There are no discharge diagnoses documented for the most recent discharge. Discharge Date: 11/3/23       Spoke with: patient    Discharge department/facility: [facility]    TCM Interactive Patient Contact:  Was patient able to fill all prescriptions: Yes  Was patient instructed to bring all medications to the follow-up visit: Yes  Is patient taking all medications as directed in the discharge summary?  Yes  Does patient understand their discharge instructions: Yes  Does patient have questions or concerns that need addressed prior to 7-14 day follow up office visit: no    Scheduled appointment with PCP within 7-14 days    Follow Up  Future Appointments   Date Time Provider [address] [place] [address]   2023  3:40 PM Angelito Lopez MD [address] [place] [address],[address] Floor [facility]   2023  9:00 AM Carina Meza MD [address] [place] [address]   2023 11:15 AM Rebekah Berry PA-C [facility]   2023 11:15 AM Jaime Gan MD [facility]       Nish Barone MA

## 2023-11-09 ENCOUNTER — OFFICE VISIT (OUTPATIENT)
Dept: FAMILY MEDICINE CLINIC | Age: 48
End: 2023-11-09

## 2023-11-09 VITALS
BODY MASS INDEX: 22.11 KG/M2 | OXYGEN SATURATION: 96 % | DIASTOLIC BLOOD PRESSURE: 70 MMHG | WEIGHT: 137 LBS | HEART RATE: 116 BPM | SYSTOLIC BLOOD PRESSURE: 102 MMHG

## 2023-11-09 DIAGNOSIS — G43.909 MIGRAINE WITHOUT STATUS MIGRAINOSUS, NOT INTRACTABLE, UNSPECIFIED MIGRAINE TYPE: ICD-10-CM

## 2023-11-09 DIAGNOSIS — Z98.890 S/P MULTIPLE SYSTEM TRAUMA SURGERY: ICD-10-CM

## 2023-11-09 DIAGNOSIS — J44.9 CHRONIC OBSTRUCTIVE PULMONARY DISEASE, UNSPECIFIED COPD TYPE (HCC): ICD-10-CM

## 2023-11-09 DIAGNOSIS — M96.1 LUMBAR POST-LAMINECTOMY SYNDROME: ICD-10-CM

## 2023-11-09 DIAGNOSIS — F32.1 CURRENT MODERATE EPISODE OF MAJOR DEPRESSIVE DISORDER WITHOUT PRIOR EPISODE (HCC): ICD-10-CM

## 2023-11-09 DIAGNOSIS — K59.00 CONSTIPATION, UNSPECIFIED CONSTIPATION TYPE: ICD-10-CM

## 2023-11-09 DIAGNOSIS — Z09 HOSPITAL DISCHARGE FOLLOW-UP: Primary | ICD-10-CM

## 2023-11-09 RX ORDER — ALBUTEROL SULFATE 90 UG/1
1 AEROSOL, METERED RESPIRATORY (INHALATION) EVERY 6 HOURS PRN
Qty: 3 EACH | Refills: 3 | Status: SHIPPED | OUTPATIENT
Start: 2023-11-09

## 2023-11-09 RX ORDER — BUDESONIDE AND FORMOTEROL FUMARATE DIHYDRATE 160; 4.5 UG/1; UG/1
AEROSOL RESPIRATORY (INHALATION)
Qty: 3 EACH | Refills: 3 | Status: SHIPPED | OUTPATIENT
Start: 2023-11-09 | End: 2023-11-15 | Stop reason: SDUPTHER

## 2023-11-09 RX ORDER — POLYETHYLENE GLYCOL 3350 17 G/17G
17 POWDER, FOR SOLUTION ORAL DAILY
Qty: 1530 G | Refills: 1 | Status: SHIPPED | OUTPATIENT
Start: 2023-11-09 | End: 2024-05-07
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
## 2023-11-10 ENCOUNTER — CARE COORDINATION (OUTPATIENT)
Dept: CARE COORDINATION | Age: 48
End: 2023-11-10

## 2023-11-10 NOTE — CARE COORDINATION
Patient Current Location: Home: [address] [facility] [address]     Phone call to Pt to follow up regarding request for ramp and lift chair. Pt reported she heard from her [place] while she was in the hospital and she didn't mention the lift chair. Pt reported she had a visit with her PCP on 11/9 who discussed the lift chair with Pt based on her ability to ambulate currently. Attempted phone call to TIANNA MEREDITH RN case manager, June Marchi. No answer, voicemail message left requesting return call, contact number provided.        GIOVANNI plan of care: GIOVANNI will follow up with TIANNA MEREDITH CM to obtain update regarding process for lift chair and ramp on 11/28

## 2023-11-13 ENCOUNTER — OFFICE VISIT (OUTPATIENT)
Dept: SURGERY | Age: 48
End: 2023-11-13

## 2023-11-13 VITALS
HEART RATE: 80 BPM | BODY MASS INDEX: 21.68 KG/M2 | DIASTOLIC BLOOD PRESSURE: 82 MMHG | WEIGHT: 134.3 LBS | SYSTOLIC BLOOD PRESSURE: 118 MMHG | OXYGEN SATURATION: 100 %

## 2023-11-13 DIAGNOSIS — Z43.3 ATTENTION TO COLOSTOMY (HCC): Primary | ICD-10-CM

## 2023-11-13 PROCEDURE — 99024 POSTOP FOLLOW-UP VISIT: CPT | Performed by: SURGERY
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
## 2023-11-14 RX ORDER — METOPROLOL TARTRATE 50 MG/1
50 TABLET, FILM COATED ORAL 2 TIMES DAILY
Qty: 180 TABLET | Refills: 3 | Status: SHIPPED | OUTPATIENT
Start: 2023-11-14

## 2023-11-15 ENCOUNTER — E-VISIT (OUTPATIENT)
Dept: PRIMARY CARE CLINIC | Age: 48
End: 2023-11-15

## 2023-11-15 DIAGNOSIS — E03.9 ACQUIRED HYPOTHYROIDISM: ICD-10-CM

## 2023-11-15 DIAGNOSIS — J06.9 UPPER RESPIRATORY TRACT INFECTION, UNSPECIFIED TYPE: Primary | ICD-10-CM

## 2023-11-15 DIAGNOSIS — G43.009 NONINTRACTABLE MIGRAINE, UNSPECIFIED MIGRAINE TYPE: ICD-10-CM

## 2023-11-15 DIAGNOSIS — J44.9 CHRONIC OBSTRUCTIVE PULMONARY DISEASE, UNSPECIFIED COPD TYPE (HCC): ICD-10-CM

## 2023-11-15 RX ORDER — MECLIZINE HYDROCHLORIDE 25 MG/1
25 TABLET ORAL DAILY PRN
Qty: 90 TABLET | Refills: 1 | Status: SHIPPED | OUTPATIENT
Start: 2023-11-15

## 2023-11-15 RX ORDER — FAMOTIDINE 20 MG/1
20 TABLET, FILM COATED ORAL 2 TIMES DAILY
Qty: 180 TABLET | Refills: 1 | Status: SHIPPED | OUTPATIENT
Start: 2023-11-15

## 2023-11-15 RX ORDER — BUDESONIDE AND FORMOTEROL FUMARATE DIHYDRATE 160; 4.5 UG/1; UG/1
AEROSOL RESPIRATORY (INHALATION)
Qty: 3 EACH | Refills: 1 | Status: SHIPPED | OUTPATIENT
Start: 2023-11-15

## 2023-11-15 RX ORDER — LEVOTHYROXINE SODIUM 0.1 MG/1
100 TABLET ORAL DAILY
Qty: 90 TABLET | Refills: 1 | Status: SHIPPED | OUTPATIENT
Start: 2023-11-15

## 2023-11-15 RX ORDER — ASPIRIN 81 MG/1
81 TABLET, CHEWABLE ORAL DAILY
Qty: 90 TABLET | Refills: 1 | Status: SHIPPED | OUTPATIENT
Start: 2023-11-15

## 2023-11-15 RX ORDER — MIDODRINE HYDROCHLORIDE 5 MG/1
5 TABLET ORAL 3 TIMES DAILY
Qty: 270 TABLET | Refills: 1 | Status: SHIPPED | OUTPATIENT
Start: 2023-11-15

## 2023-11-15 RX ORDER — TOPIRAMATE 25 MG/1
TABLET ORAL
Qty: 270 TABLET | Refills: 1 | Status: SHIPPED | OUTPATIENT
Start: 2023-11-15

## 2023-11-15 RX ORDER — PREDNISONE 10 MG/1
TABLET ORAL
Qty: 20 TABLET | Refills: 0 | Status: SHIPPED | OUTPATIENT
Start: 2023-11-15 | End: 2023-11-25

## 2023-11-15 RX ORDER — TIZANIDINE 4 MG/1
4 TABLET ORAL NIGHTLY
Qty: 90 TABLET | Refills: 1 | Status: SHIPPED | OUTPATIENT
Start: 2023-11-15

## 2023-11-15 ASSESSMENT — LIFESTYLE VARIABLES
SMOKING_STATUS: NO, I'M A FORMER SMOKER
SMOKING_YEARS: 30
PACKS_PER_DAY: 10

## 2023-11-15 NOTE — PROGRESS NOTES
Reginajosé Salazar (1975) initiated an asynchronous digital communication through [employer]. HPI: per patient questionnaire     Exam: not applicable    Diagnoses and all orders for this visit:  Diagnoses and all orders for this visit:    Upper respiratory tract infection, unspecified type    Other orders  -     predniSONE (DELTASONE) 10 MG tablet; Take 4 tablets by mouth once daily for 5 days    Symptom started 2 days ago. This is likely viral.  Agreeable to steroids. Recommend supportive care measures for 7 to 10 days. Supportive care suggestions provided. She may follow-up with me if symptoms do not improve      Time: EV3 - 21 or more minutes were spent on the digital evaluation and management of this patient. 22 min     Cliff Forrester, APRN - CNP

## 2023-11-19 ASSESSMENT — ENCOUNTER SYMPTOMS
BACK PAIN: 0
CHEST TIGHTNESS: 0
COUGH: 0
SHORTNESS OF BREATH: 0
ABDOMINAL PAIN: 0
WHEEZING: 0

## 2023-11-27 ENCOUNTER — OFFICE VISIT (OUTPATIENT)
Dept: SURGERY | Age: 48
End: 2023-11-27

## 2023-11-27 VITALS
DIASTOLIC BLOOD PRESSURE: 72 MMHG | SYSTOLIC BLOOD PRESSURE: 124 MMHG | WEIGHT: 132.7 LBS | HEIGHT: 66 IN | HEART RATE: 110 BPM | BODY MASS INDEX: 21.33 KG/M2

## 2023-11-27 DIAGNOSIS — Z48.89 ENCOUNTER FOR POSTOPERATIVE CARE: Primary | ICD-10-CM

## 2023-11-27 PROCEDURE — 99024 POSTOP FOLLOW-UP VISIT: CPT | Performed by: PHYSICIAN ASSISTANT

## 2023-11-27 PROCEDURE — APPNB30 APP NON BILLABLE TIME 0-30 MINS: Performed by: PHYSICIAN ASSISTANT

## 2023-11-27 RX ORDER — AZITHROMYCIN 250 MG/1
250 TABLET, FILM COATED ORAL SEE ADMIN INSTRUCTIONS
Qty: 6 TABLET | Refills: 0 | Status: SHIPPED | OUTPATIENT
Start: 2023-11-27 | End: 2023-12-02
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
## 2023-11-28 ENCOUNTER — CARE COORDINATION (OUTPATIENT)
Dept: CARE COORDINATION | Age: 48
End: 2023-11-28

## 2023-11-28 NOTE — CARE COORDINATION
Patient Current Location: Home: [address] [address]     Phone call to Pt to follow up regarding ramp and lift chair. Pt reported she has not heard from Caitlin not has she had a PT evaluation for lift chair / ramp. Renny Moseng is out of the office on this date as this SW has attempted to reach her for another Pt. GIOVANNI will follow up with Brandin Contreras on 12/1 regarding ramp and lift chair. GIOVANNI plan of care: GIOVANNI will make next outreach attempt to reach TIANNA MEREDITH CM regarding ramp and lift chair on 12/1.

## 2023-11-29 ASSESSMENT — ENCOUNTER SYMPTOMS
GASTROINTESTINAL NEGATIVE: 1
EYES NEGATIVE: 1
RESPIRATORY NEGATIVE: 1

## 2023-12-01 ENCOUNTER — CARE COORDINATION (OUTPATIENT)
Dept: CARE COORDINATION | Age: 48
End: 2023-12-01

## 2023-12-01 PROBLEM — Z01.818 PRE-OP TESTING: Status: RESOLVED | Noted: 2022-11-12 | Resolved: 2023-12-01

## 2023-12-01 ASSESSMENT — ENCOUNTER SYMPTOMS
RESPIRATORY NEGATIVE: 1
EYES NEGATIVE: 1
GASTROINTESTINAL NEGATIVE: 1

## 2023-12-01 NOTE — CARE COORDINATION
Attempted phone call to [email or web address] [facility] , Aicha Aragon. No answer, voicemail message left requesting return call for update regarding Pt, contact number provided. GIOVANNI plan of care: GIOVANNI will make next outreach attempt to Caitlin on 12/6 to obtain lift chair and ramp updates.

## 2023-12-06 ENCOUNTER — CARE COORDINATION (OUTPATIENT)
Dept: CARE COORDINATION | Age: 48
End: 2023-12-06

## 2023-12-06 NOTE — CARE COORDINATION
Patient Current Location: Home: [address] [facility] [address]     Phone call to Pt to follow up regarding the request for ramp and lift chair. Pt reported she still has not heard from [facility] . SW discussed plan to include her supervisor in the next email and this SW will provide update with any response received. Pt denied any other needs at this time. Secure email sent to Pt's [facility] , Jerica Pinzon and her supervisor, Cain Mays to request update on request for ramp and lift chair. GIOVANNI plan of care: SW will await response from [facility] and will provide Pt with update. GIOVANNI will make next attempt to reach [facility] on 12/13.

## 2023-12-11 ENCOUNTER — OFFICE VISIT (OUTPATIENT)
Dept: ORTHOPEDIC SURGERY | Age: 48
End: 2023-12-11
Payer: COMMERCIAL

## 2023-12-11 VITALS
WEIGHT: 130 LBS | OXYGEN SATURATION: 98 % | HEIGHT: 66 IN | RESPIRATION RATE: 16 BRPM | BODY MASS INDEX: 20.89 KG/M2 | HEART RATE: 113 BPM

## 2023-12-11 DIAGNOSIS — S92.011S CLOSED DISPLACED FRACTURE OF BODY OF RIGHT CALCANEUS, SEQUELA: ICD-10-CM

## 2023-12-11 DIAGNOSIS — S82.841S: Primary | ICD-10-CM

## 2023-12-11 PROCEDURE — 99213 OFFICE O/P EST LOW 20 MIN: CPT | Performed by: PHYSICIAN ASSISTANT

## 2023-12-11 PROCEDURE — G8420 CALC BMI NORM PARAMETERS: HCPCS | Performed by: PHYSICIAN ASSISTANT

## 2023-12-11 PROCEDURE — 4004F PT TOBACCO SCREEN RCVD TLK: CPT | Performed by: PHYSICIAN ASSISTANT

## 2023-12-11 PROCEDURE — G8484 FLU IMMUNIZE NO ADMIN: HCPCS | Performed by: PHYSICIAN ASSISTANT

## 2023-12-11 PROCEDURE — 3017F COLORECTAL CA SCREEN DOC REV: CPT | Performed by: PHYSICIAN ASSISTANT

## 2023-12-11 PROCEDURE — G8427 DOCREV CUR MEDS BY ELIG CLIN: HCPCS | Performed by: PHYSICIAN ASSISTANT

## 2023-12-11 ASSESSMENT — ENCOUNTER SYMPTOMS
GASTROINTESTINAL NEGATIVE: 1
RESPIRATORY NEGATIVE: 1
EYES NEGATIVE: 1
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
## 2023-12-13 ENCOUNTER — CARE COORDINATION (OUTPATIENT)
Dept: CARE COORDINATION | Age: 48
End: 2023-12-13

## 2023-12-13 NOTE — CARE COORDINATION
Patient Current Location: Home: [address] [facility] [address]     Phone call to Pt to follow up regarding lift chair and ramp. Pt reported she hasn't heard anything from her [place], stating last text was from 11/2 when she was in the hospital. Francisco Hatchet discussed efforts to obtain an update from both Pt and her supervisor. SW requested that Pt send her a text asking for an update and this SW will call her tomorrow to see if she has responded. If not, this SW will send an email to her supervisor reporting concerns regarding communication and request an update. GIOVANNI plan of care: SW will follow up with Pt regarding lift chair and ramp on 12/14.

## 2023-12-14 ENCOUNTER — CARE COORDINATION (OUTPATIENT)
Dept: CARE COORDINATION | Age: 48
End: 2023-12-14

## 2023-12-14 NOTE — CARE COORDINATION
Patient Current Location: Home: [address] [address]     Phone call to Pt to follow up regarding lift chair and ramp. Pt reported she texted her assigned  on 12/13 and has not received a response. GIOVANNI discussed plan to email her supervisor. Pt was in agreement. GIOVANNI sent secure email to Olivia Gil  supervisor requesting update on status of request for lift chair / ramp. GIOVANNI received automated response that she will be out of the office and returning calls / emails on 12/18.      GIOVANNI plan of care: GIOVANNI will follow up with Olivia Alcaraz  supervisor on 12/20

## 2024-01-03 ENCOUNTER — OFFICE VISIT (OUTPATIENT)
Dept: ORTHOPEDIC SURGERY | Age: 49
End: 2024-01-03
Payer: COMMERCIAL

## 2024-01-03 VITALS
BODY MASS INDEX: 20.89 KG/M2 | OXYGEN SATURATION: 95 % | HEIGHT: 66 IN | HEART RATE: 115 BPM | WEIGHT: 130 LBS | RESPIRATION RATE: 16 BRPM

## 2024-01-03 DIAGNOSIS — S92.011S CLOSED DISPLACED FRACTURE OF BODY OF RIGHT CALCANEUS, SEQUELA: Primary | ICD-10-CM

## 2024-01-03 PROCEDURE — 99212 OFFICE O/P EST SF 10 MIN: CPT | Performed by: PHYSICIAN ASSISTANT

## 2024-01-03 PROCEDURE — 4004F PT TOBACCO SCREEN RCVD TLK: CPT | Performed by: PHYSICIAN ASSISTANT

## 2024-01-03 PROCEDURE — G8427 DOCREV CUR MEDS BY ELIG CLIN: HCPCS | Performed by: PHYSICIAN ASSISTANT

## 2024-01-03 PROCEDURE — G8484 FLU IMMUNIZE NO ADMIN: HCPCS | Performed by: PHYSICIAN ASSISTANT

## 2024-01-03 PROCEDURE — G8420 CALC BMI NORM PARAMETERS: HCPCS | Performed by: PHYSICIAN ASSISTANT

## 2024-01-03 PROCEDURE — 3017F COLORECTAL CA SCREEN DOC REV: CPT | Performed by: PHYSICIAN ASSISTANT

## 2024-01-03 ASSESSMENT — ENCOUNTER SYMPTOMS
GASTROINTESTINAL NEGATIVE: 1
EYES NEGATIVE: 1
RESPIRATORY NEGATIVE: 1

## 2024-01-03 NOTE — PROGRESS NOTES
capsules by mouth in the morning and at bedtime. Takes morning and afternoon      BIOTIN PO Take by mouth      Acetaminophen (TYLENOL 8 HOUR PO)       UNABLE TO FIND Dagmar lift 1 Device 0    UNABLE TO FIND 30 inches slide board 1 Device 0    UNABLE TO FIND Electric hospital bed 1 Device 0    UNABLE TO FIND Toilet commode chair 1 Device 0    UNABLE TO FIND Wheel chair 1 Device 0    UNABLE TO FIND Lift chair 1 Device 0    UNABLE TO FIND Shower chair 1 Device 0    UNABLE TO FIND Walker with seat 1 Device 0    UNABLE TO FIND cane 1 Device 0    Fluticasone furoate-vilanterol (BREO ELLIPTA) 200-25 MCG/INH AEPB inhaler Inhale 1 puff into the lungs daily 1 each 3    Blood Pressure Monitoring (BLOOD PRESSURE CUFF) MISC Daily 1 each 0    gabapentin (NEURONTIN) 600 MG tablet Take 1 tablet by mouth nightly for 90 days. at bedtime. 90 tablet 0     No current facility-administered medications for this visit.       Allergies   Allergen Reactions    Adhesive Tape Rash     Pt noted that anytime she had any sort of adhesive tape, causes redness and rash on her skin       Review of Systems:  See above      Physical Exam:   Pulse (!) 115   Resp 16   Ht 1.676 m (5' 6\")   Wt 59 kg (130 lb)   SpO2 95%   BMI 20.98 kg/m²       Patient is not currently weightbearing.   Gen/Psych:Examination reveals a pleasant individual in no acute distress. The patient is oriented to time, place and person.  The patient's mood and affect are appropriate.     Lymph: The lymphatic examination bilaterally reveals all areas to be without enlargement or induration.      Skin intact without lymphadenopathy, discoloration, or abnormal temperature.      Vascular: There is intact, symmetric circulation in both lower extremities.      Right ankle/foot:  There is no erythema, no significant edema.  There is tenderness to palpation of the anterior lateral aspect of the ankle, ATFL.    Imaging studies:  FINDINGS:  The bones are demineralized.  The patient is status

## 2024-01-03 NOTE — PATIENT INSTRUCTIONS
Lace up ankle brace given in office today  Hand control for the car letter will be researched  Continue weight-bearing as tolerated.  Continue range of motion exercises as instructed.  Ice and elevate as needed.  Tylenol or Motrin for pain.  Follow up as needed    We are committed to providing you the best care possible.  If you receive a survey after visiting one of our offices, please take time to share your experience concerning your physician office visit.  These surveys are confidential and no health information about you is shared.  We are eager to improve for you and we are counting on your feedback to help make that happen.

## 2024-01-10 ENCOUNTER — CARE COORDINATION (OUTPATIENT)
Dept: CARE COORDINATION | Age: 49
End: 2024-01-10

## 2024-01-10 NOTE — CARE COORDINATION
Patient Current Location: Home: [address] [facility] [address]     Phone call to Pt to follow up regarding PT evaluation for ramp and lift chair. Pt reported she has not yet heard from anyone to schedule PT evaluation. Pt reported she received a call from a home health agency stating they had availability for an aide. Pt declined as her  has been caring for her. GIOVANNI discussed plan to follow up with assigned [facility]  regarding PT eval for ramp / lift chair. Pt was in agreement.     GIOVANNI sent secure email to assigned [facility] , Hayde. GIOVANNI received an automatic response stating CM is on leave with no end date, providing email address for her supervisor, Brittany. GIOVANNI sent secure email to [facility] Supervisor Brittany Denis requesting update and offering assistance.     GIOVANNI plan of care: GIOVANNI will make next outreach to [facility] staff on 1/16 to follow up regarding ramp / lift chair.

## 2024-01-16 ENCOUNTER — CARE COORDINATION (OUTPATIENT)
Dept: CARE COORDINATION | Age: 49
End: 2024-01-16

## 2024-01-16 NOTE — CARE COORDINATION
GIOVANNI received incoming call from Nereida Barrientos covering , Margaret who reported she did send referral at this time for PT evaluation for ramp and lift chair.     GIOVANNI plan of care: SW will follow up with Pt on 1/22 to determine if she has had PT evaluation for lift chair / ramp

## 2024-01-18 ENCOUNTER — CARE COORDINATION (OUTPATIENT)
Dept: CARE COORDINATION | Age: 49
End: 2024-01-18

## 2024-01-18 NOTE — CARE COORDINATION
Patient Current Location: Home: [address] [place] [address]     SW received incoming email from Margaret Eason CM stating she was following up on a motorized w/c, but the company can't get in contact with Pt and Margaret tried to reach Pt but couldn't get in contact with her. This SW responded that it is believed that Pt has a motorized w/c, but will try to call Pt to verify.     Phone call to Pt who confirmed she did have PT evaluation and the PT did recommend other DME as well as lift chair and ramp. Pt did state she has an upcoming PCP appointment scheduled for 2/9. Pt reported she did receive a motorized w/c already.     GIOVANNI sent email to Margaret Padron CM with update asking for list of DME to provide PCP.     Margaret responded stating, \"I wasn't aware that she received a power wheelchair.  We are working on the DME from the PT evaluation I had requested.  We will contact the PCP.  Thank you for letting me know\"       Phone call to Pt to provide update.     GIOVANNI sent secure response back to Margaret Padron CM stating Pt may be interested in moving her appointment up if PCP needs to see her prior to ordering equipment.       GIOVANNI plan of care: GIOVANNI will make next outreach to Pt in one week on 1/25

## 2024-01-25 ENCOUNTER — CARE COORDINATION (OUTPATIENT)
Dept: CARE COORDINATION | Age: 49
End: 2024-01-25

## 2024-01-25 NOTE — CARE COORDINATION
Attempted phone call to Pt to follow up regarding lift chair, ramp and DME. No answer, vm message left requesting return call, contact number provided.     GIOVANNI plan of care: SW will make next outreach attempt on 2/6 to follow up regarding lift chair, ramp and DME.

## 2024-02-06 ENCOUNTER — CARE COORDINATION (OUTPATIENT)
Dept: CARE COORDINATION | Age: 49
End: 2024-02-06

## 2024-02-06 NOTE — CARE COORDINATION
Patient Current Location: Home: [address] [place] [address]     Phone call to Pt who reported the PT came out and recommended DME, but hasn't heard anything else. Pt reported she plans on taking the list with her to her PCP on 2/9. GIOVANNI discussed plan to follow up with Nereida  regarding status of lift chair and ramp. Pt was in agreement.     Phone call to Margaret Padron CM for update on DME, lift chair and ramp.  Mailed a form C to Bettina on 1/18, needed for ramp, needed in order to get the home mods completed. She and whoever owns the home    Grab bars, outdoor w/c ramp, electric bath seat and transfer pole for bedroom     Sent rx requests to PCP with threshold ramp, raised toilet seat with handles, build up shoe, suitcase ramp    Fernando Norris will go out to the home to look at home and then will send out 3 bids    Phone call to Pt to provide update and to ask if she received form C. Pt denied receiving a form  that needed signed to consent for home modifications in the home.     Phone call to Nereida Robles CM to report that Pt did not receive the letter. Margaret reported she would make another request for the document to be mailed out to Pt.     GIOVANNI plan of care: GIOVANNI will follow up with Pt regarding mailing and DME on 2/13

## 2024-02-09 ENCOUNTER — OFFICE VISIT (OUTPATIENT)
Dept: FAMILY MEDICINE CLINIC | Age: 49
End: 2024-02-09
Payer: COMMERCIAL

## 2024-02-09 VITALS
HEART RATE: 106 BPM | WEIGHT: 135 LBS | SYSTOLIC BLOOD PRESSURE: 128 MMHG | OXYGEN SATURATION: 99 % | BODY MASS INDEX: 21.79 KG/M2 | DIASTOLIC BLOOD PRESSURE: 84 MMHG

## 2024-02-09 DIAGNOSIS — G43.009 NONINTRACTABLE MIGRAINE, UNSPECIFIED MIGRAINE TYPE: ICD-10-CM

## 2024-02-09 DIAGNOSIS — R26.2 DIFFICULTY IN WALKING: ICD-10-CM

## 2024-02-09 DIAGNOSIS — R68.84 JAW PAIN: ICD-10-CM

## 2024-02-09 DIAGNOSIS — S82.92XS CLOSED FRACTURE OF MULTIPLE BONES OF BOTH LOWER EXTREMITIES, SEQUELA: ICD-10-CM

## 2024-02-09 DIAGNOSIS — F32.1 CURRENT MODERATE EPISODE OF MAJOR DEPRESSIVE DISORDER WITHOUT PRIOR EPISODE (HCC): ICD-10-CM
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
## 2024-02-09 DIAGNOSIS — T07.XXXA MULTIPLE FRACTURES: ICD-10-CM

## 2024-02-09 DIAGNOSIS — E03.9 ACQUIRED HYPOTHYROIDISM: ICD-10-CM

## 2024-02-09 DIAGNOSIS — Z00.00 MEDICARE ANNUAL WELLNESS VISIT, SUBSEQUENT: Primary | ICD-10-CM

## 2024-02-09 DIAGNOSIS — J44.9 CHRONIC OBSTRUCTIVE PULMONARY DISEASE, UNSPECIFIED COPD TYPE (HCC): ICD-10-CM

## 2024-02-09 DIAGNOSIS — S82.91XS CLOSED FRACTURE OF MULTIPLE BONES OF BOTH LOWER EXTREMITIES, SEQUELA: ICD-10-CM

## 2024-02-09 DIAGNOSIS — M96.1 LUMBAR POST-LAMINECTOMY SYNDROME: ICD-10-CM

## 2024-02-09 DIAGNOSIS — H92.01 RIGHT EAR PAIN: ICD-10-CM

## 2024-02-09 DIAGNOSIS — F17.200 TOBACCO DEPENDENCE: ICD-10-CM

## 2024-02-09 DIAGNOSIS — Z98.890 S/P MULTIPLE SYSTEM TRAUMA SURGERY: ICD-10-CM

## 2024-02-09 DIAGNOSIS — K43.9 VENTRAL HERNIA WITHOUT OBSTRUCTION OR GANGRENE: ICD-10-CM

## 2024-02-09 DIAGNOSIS — F11.20 CHRONIC NARCOTIC DEPENDENCE (HCC): ICD-10-CM

## 2024-02-09 PROCEDURE — G0439 PPPS, SUBSEQ VISIT: HCPCS | Performed by: FAMILY MEDICINE

## 2024-02-09 PROCEDURE — G8420 CALC BMI NORM PARAMETERS: HCPCS | Performed by: FAMILY MEDICINE

## 2024-02-09 PROCEDURE — 4004F PT TOBACCO SCREEN RCVD TLK: CPT | Performed by: FAMILY MEDICINE

## 2024-02-09 PROCEDURE — 99214 OFFICE O/P EST MOD 30 MIN: CPT | Performed by: FAMILY MEDICINE

## 2024-02-09 PROCEDURE — 3023F SPIROM DOC REV: CPT | Performed by: FAMILY MEDICINE

## 2024-02-09 PROCEDURE — 3017F COLORECTAL CA SCREEN DOC REV: CPT | Performed by: FAMILY MEDICINE

## 2024-02-09 PROCEDURE — G8427 DOCREV CUR MEDS BY ELIG CLIN: HCPCS | Performed by: FAMILY MEDICINE

## 2024-02-09 PROCEDURE — G8484 FLU IMMUNIZE NO ADMIN: HCPCS | Performed by: FAMILY MEDICINE

## 2024-02-09 RX ORDER — ALBUTEROL SULFATE 90 UG/1
1 AEROSOL, METERED RESPIRATORY (INHALATION) EVERY 6 HOURS PRN
Qty: 3 EACH | Refills: 3 | Status: SHIPPED | OUTPATIENT
Start: 2024-02-09

## 2024-02-09 RX ORDER — MIDODRINE HYDROCHLORIDE 5 MG/1
5 TABLET ORAL 3 TIMES DAILY
Qty: 270 TABLET | Refills: 1 | Status: SHIPPED | OUTPATIENT
Start: 2024-02-09

## 2024-02-09 RX ORDER — FAMOTIDINE 20 MG/1
20 TABLET, FILM COATED ORAL 2 TIMES DAILY
Qty: 180 TABLET | Refills: 1 | Status: SHIPPED | OUTPATIENT
Start: 2024-02-09

## 2024-02-09 RX ORDER — TOPIRAMATE 25 MG/1
TABLET ORAL
Qty: 270 TABLET | Refills: 1 | Status: SHIPPED | OUTPATIENT
Start: 2024-02-09

## 2024-02-09 RX ORDER — CEFUROXIME AXETIL 250 MG/1
250 TABLET ORAL 2 TIMES DAILY
Qty: 20 TABLET | Refills: 0 | Status: SHIPPED | OUTPATIENT
Start: 2024-02-09 | End: 2024-02-19

## 2024-02-09 RX ORDER — ASPIRIN 81 MG/1
81 TABLET, CHEWABLE ORAL DAILY
Qty: 90 TABLET | Refills: 1 | Status: SHIPPED | OUTPATIENT
Start: 2024-02-09

## 2024-02-09 RX ORDER — TIZANIDINE 4 MG/1
4 TABLET ORAL NIGHTLY
Qty: 90 TABLET | Refills: 1 | Status: SHIPPED | OUTPATIENT
Start: 2024-02-09

## 2024-02-09 RX ORDER — BUDESONIDE AND FORMOTEROL FUMARATE DIHYDRATE 160; 4.5 UG/1; UG/1
AEROSOL RESPIRATORY (INHALATION)
Qty: 3 EACH | Refills: 1 | Status: SHIPPED | OUTPATIENT
Start: 2024-02-09

## 2024-02-09 RX ORDER — METOPROLOL TARTRATE 50 MG/1
50 TABLET, FILM COATED ORAL 3 TIMES DAILY
Qty: 270 TABLET | Refills: 3 | Status: SHIPPED | OUTPATIENT
Start: 2024-02-09

## 2024-02-09 RX ORDER — DESVENLAFAXINE 25 MG/1
25 TABLET, EXTENDED RELEASE ORAL DAILY
Qty: 30 TABLET | Refills: 2 | Status: SHIPPED | OUTPATIENT
Start: 2024-02-09

## 2024-02-09 ASSESSMENT — LIFESTYLE VARIABLES
HOW OFTEN DO YOU HAVE A DRINK CONTAINING ALCOHOL: NEVER
HOW MANY STANDARD DRINKS CONTAINING ALCOHOL DO YOU HAVE ON A TYPICAL DAY: PATIENT DOES NOT DRINK

## 2024-02-09 ASSESSMENT — PATIENT HEALTH QUESTIONNAIRE - PHQ9
8. MOVING OR SPEAKING SO SLOWLY THAT OTHER PEOPLE COULD HAVE NOTICED. OR THE OPPOSITE, BEING SO FIGETY OR RESTLESS THAT YOU HAVE BEEN MOVING AROUND A LOT MORE THAN USUAL: 1
SUM OF ALL RESPONSES TO PHQ QUESTIONS 1-9: 15
3. TROUBLE FALLING OR STAYING ASLEEP: 2
SUM OF ALL RESPONSES TO PHQ QUESTIONS 1-9: 15
2. FEELING DOWN, DEPRESSED OR HOPELESS: 2
4. FEELING TIRED OR HAVING LITTLE ENERGY: 3
SUM OF ALL RESPONSES TO PHQ9 QUESTIONS 1 & 2: 4
5. POOR APPETITE OR OVEREATING: 0
SUM OF ALL RESPONSES TO PHQ QUESTIONS 1-9: 15
6. FEELING BAD ABOUT YOURSELF - OR THAT YOU ARE A FAILURE OR HAVE LET YOURSELF OR YOUR FAMILY DOWN: 3
10. IF YOU CHECKED OFF ANY PROBLEMS, HOW DIFFICULT HAVE THESE PROBLEMS MADE IT FOR YOU TO DO YOUR WORK, TAKE CARE OF THINGS AT HOME, OR GET ALONG WITH OTHER PEOPLE: 2
7. TROUBLE CONCENTRATING ON THINGS, SUCH AS READING THE NEWSPAPER OR WATCHING TELEVISION: 2
1. LITTLE INTEREST OR PLEASURE IN DOING THINGS: 2
SUM OF ALL RESPONSES TO PHQ QUESTIONS 1-9: 15
9. THOUGHTS THAT YOU WOULD BE BETTER OFF DEAD, OR OF HURTING YOURSELF: 0

## 2024-02-09 NOTE — PATIENT INSTRUCTIONS
information.      Personalized Preventive Plan for Bettina Serna - 2/9/2024  Medicare offers a range of preventive health benefits. Some of the tests and screenings are paid in full while other may be subject to a deductible, co-insurance, and/or copay.    Some of these benefits include a comprehensive review of your medical history including lifestyle, illnesses that may run in your family, and various assessments and screenings as appropriate.    After reviewing your medical record and screening and assessments performed today your provider may have ordered immunizations, labs, imaging, and/or referrals for you.  A list of these orders (if applicable) as well as your Preventive Care list are included within your After Visit Summary for your review.    Other Preventive Recommendations:    A preventive eye exam performed by an eye specialist is recommended every 1-2 years to screen for glaucoma; cataracts, macular degeneration, and other eye disorders.  A preventive dental visit is recommended every 6 months.  Try to get at least 150 minutes of exercise per week or 10,000 steps per day on a pedometer .  Order or download the FREE \"Exercise & Physical Activity: Your Everyday Guide\" from [employer]. Call 1-478.936.4936 or search [employer] online.  You need 6892-3297 mg of calcium and 0687-3324 IU of vitamin D per day. It is possible to meet your calcium requirement with diet alone, but a vitamin D supplement is usually necessary to meet this goal.  When exposed to the sun, use a sunscreen that protects against both UVA and UVB radiation with an SPF of 30 or greater. Reapply every 2 to 3 hours or after sweating, drying off with a towel, or swimming.  Always wear a seat belt when traveling in a car. Always wear a helmet when riding a bicycle or motorcycle.

## 2024-02-13 ENCOUNTER — CARE COORDINATION (OUTPATIENT)
Dept: CARE COORDINATION | Age: 49
End: 2024-02-13

## 2024-02-13 NOTE — CARE COORDINATION
Patient Current Location: Home: [address] [place] [address]     Phone call to Pt to follow up regarding her PCP appointment on 2/9. Pt reported PCP is requesting a copy of the PT evaluation prior to writing RX for the requested DME. PCP did make a copy of the list Pt provided her.     GIOVANNI sent secure email to covering Nereida Barrientos , Margaret Mahmood requesting she sends PT eval to PCP via fax [phone].     GIOVANNI plan of care: SW will await response from Margaret Padron CM regarding DME. SW will make next outreach to Pt on 2/20 to provide update.

## 2024-02-20 ENCOUNTER — HOSPITAL ENCOUNTER (OUTPATIENT)
Dept: ULTRASOUND IMAGING | Age: 49
Discharge: HOME | End: 2024-02-20
Payer: COMMERCIAL

## 2024-02-20 DIAGNOSIS — N64.59 ABNORMAL BREAST FINDING: ICD-10-CM

## 2024-02-20 PROCEDURE — 76642 ULTRASOUND BREAST LIMITED: CPT

## 2024-02-21 ENCOUNTER — CARE COORDINATION (OUTPATIENT)
Dept: CARE COORDINATION | Age: 49
End: 2024-02-21

## 2024-02-21 NOTE — CARE COORDINATION
GIOVANNI received incoming email on 2/13 from Nereida Barrientos CM that she would send PT evaluation to Dr. Kerns's office.     GIOVANNI sent email on this date asking if she has received anything in return from PCP office for orders. GIOVANNI offering to follow up if not.     Attempted phone call to MA at PCP office to confirm they received the PT eval for Pt. No answer, vm message left requesting return call, contact number provided.     GIOVANNI plan of care: GIOVANNI will await return email / call form Iliana MITCHELL and PCP office to assist in coordination of care / DME.

## 2024-02-22 ASSESSMENT — ENCOUNTER SYMPTOMS
COUGH: 1
CHEST TIGHTNESS: 0
ABDOMINAL PAIN: 0
SHORTNESS OF BREATH: 0
WHEEZING: 0
BACK PAIN: 0
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
## 2024-02-23 ENCOUNTER — CARE COORDINATION (OUTPATIENT)
Dept: CARE COORDINATION | Age: 49
End: 2024-02-23

## 2024-02-23 NOTE — CARE COORDINATION
Received incoming phone call from Margaret Padron CM confirming she received orders for 2 suitcase ramps, which were sent to [facility]. Margaret asked for update regarding orders for electric bath seat, grab bar in bathroom, threshold ramp, a transfer pole for the bedroom, raised toilet seat with handles, built up shoe for rt lower extremity.  GIOVANNI did send message to Bonny MCCULLOUGH to request update. MA responded that she would resend the fax. GIOVANNI attempted phone call to Margaret Padron CM to provide update. No answer, vm message left stating MA would resend fax.     GIOVANNI plan of care: GIOVANNI will make next outreach to Nereida MITCHELL on 3/8 to follow up regarding progress with bathroom mod, ramp and DME.

## 2024-03-07 ENCOUNTER — CARE COORDINATION (OUTPATIENT)
Dept: CARE COORDINATION | Age: 49
End: 2024-03-07

## 2024-03-07 NOTE — CARE COORDINATION
Phone call to Betsey Eason CM to introduce self and obtain update regarding DME and lift chair.     Betsey contacted former , Margaret for a 3 way call. Margaret reported everything has been received, waiting on [facility] to provide status of the suitcase ramps.     Status on toilet seat riser - waiting for follow up - won't have answers until tomorrow, someone handles DME at [facility] and she's in meetings all day today     Have everything they need at this time, will continue to follow up and will Nereida Barrientos  will provide Pt with updates as they occur.     GIOVANNI plan of care: GIOVANNI will make next outreach to Pt and Nereida Barrientos on 3/28

## 2024-03-12 ENCOUNTER — TELEPHONE (OUTPATIENT)
Dept: FAMILY MEDICINE CLINIC | Age: 49
End: 2024-03-12

## 2024-03-12 DIAGNOSIS — R91.8 ABNORMAL CT LUNG SCREENING: Primary | ICD-10-CM

## 2024-03-12 DIAGNOSIS — J44.9 CHRONIC OBSTRUCTIVE PULMONARY DISEASE, UNSPECIFIED COPD TYPE (HCC): ICD-10-CM

## 2024-03-12 DIAGNOSIS — F17.200 TOBACCO DEPENDENCE: ICD-10-CM

## 2024-03-12 NOTE — TELEPHONE ENCOUNTER
It was ordered as requested, it was performed last March with minor abnormality with call for follow-up in 1 year

## 2024-03-12 NOTE — TELEPHONE ENCOUNTER
Patient called and wanted to let the provider know that she has received a message from [facility] that stated that she is due for a Lung Screening/ CT and that provider would need to order. And that it is needed to be completed by the end of Month. Please advise.

## 2024-03-18 ENCOUNTER — TELEPHONE (OUTPATIENT)
Dept: FAMILY MEDICINE CLINIC | Age: 49
End: 2024-03-18

## 2024-03-18 NOTE — TELEPHONE ENCOUNTER
Patient called and requested a handicap plaque.  She requested a one that is for longer than a year, due to her mobility.  Please advise.   
None known in lifetime

## 2024-03-25 ENCOUNTER — TELEPHONE (OUTPATIENT)
Dept: FAMILY MEDICINE CLINIC | Age: 49
End: 2024-03-25

## 2024-03-25 NOTE — TELEPHONE ENCOUNTER
This covering provider signed the handicap placard for dr levy as she is out of office this week, but printed/ ordered the script prior

## 2024-03-28 ENCOUNTER — CARE COORDINATION (OUTPATIENT)
Dept: CARE COORDINATION | Age: 49
End: 2024-03-28

## 2024-03-28 ENCOUNTER — HOSPITAL ENCOUNTER (OUTPATIENT)
Dept: CT IMAGING | Age: 49
Discharge: HOME | End: 2024-03-28
Payer: COMMERCIAL

## 2024-03-28 DIAGNOSIS — F17.200 TOBACCO DEPENDENCE: ICD-10-CM

## 2024-03-28 DIAGNOSIS — J44.9 CHRONIC OBSTRUCTIVE PULMONARY DISEASE, UNSPECIFIED COPD TYPE (HCC): ICD-10-CM

## 2024-03-28 DIAGNOSIS — R91.8 ABNORMAL CT LUNG SCREENING: ICD-10-CM

## 2024-03-28 PROCEDURE — 71271 CT THORAX LUNG CANCER SCR C-: CPT

## 2024-03-28 NOTE — CARE COORDINATION
Patient Current Location: Home: [address] [address]     Phone call to Pt who reported she has not received any updates from Nereida regarding lift chair / ramp. Pt reported she believes she was approved for everything.     Discussed completing ACP documents. Pt reported she has the documents at home, but has not yet completed them. Pt identified that she will make her daughter, Yanelis Arrington as her primary decision maker and her daughter, Jana Taylor will be her secondary decision maker.     Offered to assist with completing via [employer] and provided education on the process. Pt declined this option, stating she will complete them on her own. SW did request that she provides a copy to her PCP office. Pt was in agreement, stating that is who gave her a copy of the documents.     GIOVANNI plan of care: GIOVANNI will follow up with Pt in 2 weeks to follow up regarding lift chair, ramp and other dme requested.

## 2024-04-07 ENCOUNTER — TELEPHONE (OUTPATIENT)
Dept: FAMILY MEDICINE CLINIC | Age: 49
End: 2024-04-07

## 2024-04-07 DIAGNOSIS — F17.200 TOBACCO DEPENDENCE: Primary | ICD-10-CM

## 2024-04-07 RX ORDER — VARENICLINE TARTRATE 1 MG/1
1 TABLET, FILM COATED ORAL 2 TIMES DAILY
Qty: 60 TABLET | Refills: 3 | Status: SHIPPED | OUTPATIENT
Start: 2024-04-07

## 2024-04-07 RX ORDER — VARENICLINE TARTRATE 0.5 (11)-1
KIT ORAL
Qty: 1 EACH | Refills: 0 | Status: SHIPPED | OUTPATIENT
Start: 2024-04-07

## 2024-04-11 ENCOUNTER — CARE COORDINATION (OUTPATIENT)
Dept: CARE COORDINATION | Age: 49
End: 2024-04-11

## 2024-04-11 NOTE — CARE COORDINATION
Attempted phone call to Betsey Eason CM. The number is not active. GIOVANNI sent email to [facility] to verify contact number or obtain contact for new assigned .     GIOVANNI plan of care: GIOVANNI will await return email / call from [facility] with Nereida Barrientos. If no response, GIOVANNI will make next outreach attempt on 4/16.

## 2024-04-16 ENCOUNTER — CARE COORDINATION (OUTPATIENT)
Dept: CARE COORDINATION | Age: 49
End: 2024-04-16

## 2024-04-16 NOTE — CARE COORDINATION
Attempted phone call to Margaret Mahmood, previously assigned Nereida MITCHELL to assist in obtaining contact information for assigned . No answer, vm message left requesting return call, contact number provided.     GIOVANNI plan of care: GIOVANNI will make next outreach attempt to nereida to obtain contact information for  on 4/19

## 2024-04-19 ENCOUNTER — CARE COORDINATION (OUTPATIENT)
Dept: CARE COORDINATION | Age: 49
End: 2024-04-19

## 2024-04-19 NOTE — CARE COORDINATION
Sent secure email to Nereida Zabala CM supervisor to obtain contact information for new assigned .     GIOVANNI plan of care: GIOVANNI will await return email from Nereida supervisor. GIOVANNI will contact Nereida to obtain information if no response by 4/24.

## 2024-04-22 ENCOUNTER — TELEPHONE (OUTPATIENT)
Dept: FAMILY MEDICINE CLINIC | Age: 49
End: 2024-04-22
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
## 2024-04-22 NOTE — TELEPHONE ENCOUNTER
Patient was agreeable to provider writing letter, and requested to have it faxed to Dr. Tyler's office. She also stated that she will not be taking the xanax and the  oxycodone. Patient also wanted to thank provider.

## 2024-04-24 ENCOUNTER — CARE COORDINATION (OUTPATIENT)
Dept: CARE COORDINATION | Age: 49
End: 2024-04-24

## 2024-04-24 DIAGNOSIS — S82.91XS CLOSED FRACTURE OF MULTIPLE BONES OF BOTH LOWER EXTREMITIES, SEQUELA: ICD-10-CM

## 2024-04-24 DIAGNOSIS — L76.82 POSTOPERATIVE SURGICAL COMPLICATION INVOLVING SKIN ASSOCIATED WITH DERMATOLOGIC PROCEDURE, UNSPECIFIED COMPLICATION: ICD-10-CM

## 2024-04-24 DIAGNOSIS — S81.801A WOUND OF RIGHT LOWER EXTREMITY, INITIAL ENCOUNTER: ICD-10-CM
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
## 2024-04-24 DIAGNOSIS — Z98.890 S/P MULTIPLE SYSTEM TRAUMA SURGERY: ICD-10-CM

## 2024-04-24 DIAGNOSIS — R26.2 UNABLE TO AMBULATE: ICD-10-CM

## 2024-04-24 DIAGNOSIS — S82.92XS CLOSED FRACTURE OF MULTIPLE BONES OF BOTH LOWER EXTREMITIES, SEQUELA: ICD-10-CM

## 2024-04-24 DIAGNOSIS — S12.9XXS PSEUDOARTHROSIS OF CERVICAL SPINE, SEQUELA: ICD-10-CM

## 2024-04-24 NOTE — CARE COORDINATION
Patient Current Location: Home: [address] [place], [place] [address]     Phone call to Verito with provider services for Nereida Barrientos to identify new assigned .     2/1/17 - march 31 2024 - terminated      - Tatyana Castellano - email: [email or web address] / phone number: [phone]    [id number] - call reference number    Phone call to Pt to provide update.     Attempted phone call to Tatyana Barrientos CM, no answer, not the correct phone number. SW sent secure email to Iliana MITCHELL requesting a return call to discuss Pt needs.     Received return email from Tatyana stating she is not the assigned CM, Received incoming email from Elif Castellano with updated contact information phone: [phone] / email: [email or web address]    Phone call to Nereida Barrientos CM, Elif who reported Pt should start to have providers coming out to the home regarding bids for equipment. [place] is requesting a RX for lift chair sent to her at [phone]     plan of care:  will make next outreach to Elif on 4/29 to confirm she has received rx for lift chair.

## 2024-04-29 ENCOUNTER — CARE COORDINATION (OUTPATIENT)
Dept: CARE COORDINATION | Age: 49
End: 2024-04-29

## 2024-04-29 NOTE — CARE COORDINATION
GIOVANNI sent secure email to Nereida Barrientos CM, Elif Castellano requesting to confirm she received RX for lift chair.     GIOVANNI plan of care: GIOVANNI will await response from Nereida Barrientos CM. GIOVANNI will make next outreach attempt on 5/3 if no response by then.

## 2024-05-03 ENCOUNTER — CARE COORDINATION (OUTPATIENT)
Dept: CARE COORDINATION | Age: 49
End: 2024-05-03

## 2024-05-03 NOTE — CARE COORDINATION
Phone call to Nereida Asencio CM to confirm she has received RX for lift chair. Elif confirmed she received it and it was added to the care plan and they are waiting for the dept that handles lift chairs [facility] plan of care: GIOVANNI will follow up with Elif regarding lift chair in 2 weeks for update.

## (undated) DEVICE — SOLUTION IV 1000ML 0.9% SOD CHL FOR IRRIG PLAS CONT

## (undated) DEVICE — PROTECTOR EYE PT SELF ADH NS OPT GRD LF

## (undated) DEVICE — SOLUTION IV IRRIG WATER 1000ML POUR BRL 2F7114

## (undated) DEVICE — SUTURE PROL SZ 2-0 L30IN NONABSORBABLE BLU L26MM SH 1/2 CIR 8833H

## (undated) DEVICE — GLOVE ORANGE PI 7 1/2   MSG9075

## (undated) DEVICE — TISSUE RETRIEVAL SYSTEM: Brand: INZII RETRIEVAL SYSTEM

## (undated) DEVICE — WOUND RETRACTOR AND PROTECTOR: Brand: ALEXIS WOUND PROTECTOR-RETRACTOR

## (undated) DEVICE — SUTURE NONABSORBABLE MONOFILAMENT 4-0 FS-2 18 IN ETHILON 662H

## (undated) DEVICE — NEEDLE HYPO 20GA L1.5IN YEL POLYPR HUB S STL REG BVL STR

## (undated) DEVICE — SUTURE SZ 0 27IN 5/8 CIR UR-6  TAPER PT VIOLET ABSRB VICRYL J603H

## (undated) DEVICE — COUNTER NDL 30 COUNT FOAM STRP SGL MAG

## (undated) DEVICE — ENDOSCOPY KIT: Brand: MEDLINE INDUSTRIES, INC.

## (undated) DEVICE — Device

## (undated) DEVICE — LINER,SEMI-RIGID,3000CC,50EA/CS: Brand: MEDLINE

## (undated) DEVICE — TOWEL,OR,DSP,ST,BLUE,STD,6/PK,12PK/CS: Brand: MEDLINE

## (undated) DEVICE — SUTURE PERMAHAND SZ 2-0 L17X18IN NONABSORBABLE BLK SILK SA65H

## (undated) DEVICE — SUREFORM 45 RELOAD GREEN: Brand: SUREFORM

## (undated) DEVICE — PREMIUM WET SKIN PREP TRAY: Brand: MEDLINE INDUSTRIES, INC.

## (undated) DEVICE — TUBING, SUCTION, 9/32" X 10', STRAIGHT: Brand: MEDLINE

## (undated) DEVICE — SUTURE VCRL SZ 3-0 L27IN ABSRB UD L26MM SH 1/2 CIR J416H

## (undated) DEVICE — TAPE MED W1/8XL30IN WHT POLY

## (undated) DEVICE — GOWN,SIRUS,POLYRNF,BRTHSLV,XLN/XL,20/CS: Brand: MEDLINE

## (undated) DEVICE — SUTURE VCRL SZ 3-0 L27IN ABSRB UD L36MM CT-1 1/2 CIR J258H

## (undated) DEVICE — ARM DRAPE

## (undated) DEVICE — Z INACTIVE NO ACTIVE SUPPLIER APPLICATOR MEDICATED 26 CC TINT HI-LITE ORNG STRL CHLORAPREP

## (undated) DEVICE — 3M™ IOBAN™ 2 ANTIMICROBIAL INCISE DRAPE 6650EZ: Brand: IOBAN™ 2

## (undated) DEVICE — TROCAR: Brand: KII FIOS FIRST ENTRY

## (undated) DEVICE — 1LYRTR 16FR10ML100%SILI UMSNAP: Brand: MEDLINE INDUSTRIES, INC.

## (undated) DEVICE — EXCEL 10FT (3.05 M) INSUFFLATION TUBING SET WITH 0.1 MICRON FILTER: Brand: EXCEL

## (undated) DEVICE — POSITIONER,HEAD,RING CUSHION,9IN,32CS: Brand: MEDLINE

## (undated) DEVICE — SYRINGE MED 20ML STD CLR PLAS LUERLOCK TIP N CTRL DISP

## (undated) DEVICE — TIP COVER ACCESSORY

## (undated) DEVICE — GLOVE SURG SZ 65 THK91MIL LTX FREE SYN POLYISOPRENE

## (undated) DEVICE — SURGICAL PROCEDURE PACK LITH ROBOTIC

## (undated) DEVICE — SEAL

## (undated) DEVICE — SYRINGE IRRIG 60ML SFT PLIABLE BLB EZ TO GRP 1 HND USE W/

## (undated) DEVICE — YANKAUER,BULB TIP,W/O VENT,RIGID,STERILE: Brand: MEDLINE

## (undated) DEVICE — DUAL LUMEN STOMACH TUBE: Brand: SALEM SUMP

## (undated) DEVICE — COLUMN DRAPE

## (undated) DEVICE — SUTURE PERMAHAND SZ 3-0 L30IN NONABSORBABLE BLK SH L26MM K832H

## (undated) DEVICE — ELECTRODE ES AD CRDLSS PT RET REM POLYHESIVE

## (undated) DEVICE — SUREFORM 45 RELOAD BLUE: Brand: SUREFORM

## (undated) DEVICE — GLOVE SURG SZ 6 THK91MIL LTX FREE SYN POLYISOPRENE ANTI

## (undated) DEVICE — SET TBNG DISP TIP FOR AHTO

## (undated) DEVICE — SUTURE STRATAFIX SZ 3-0 L20CM ABSRB VLT NDL SH-1 L22MM 1/2 SXPP1B453

## (undated) DEVICE — SUTURE VCRL SZ 4-0 L18IN ABSRB UD L19MM PS-2 3/8 CIR PRIM J496H

## (undated) DEVICE — 40588 XL ADVANCED TRENDELENBURG POSITIONING KIT: Brand: 40588 XL ADVANCED TRENDELENBURG POSITIONING KIT

## (undated) DEVICE — YANKAUER,FLEXIBLE HANDLE,REGLR CAPACITY: Brand: MEDLINE INDUSTRIES, INC.

## (undated) DEVICE — TRAY PREP DRY W/ PREM GLV 2 APPL 6 SPNG 2 UNDPD 1 OVERWRAP

## (undated) DEVICE — SUREFORM 45: Brand: SUREFORM

## (undated) DEVICE — VESSEL SEALER EXTEND: Brand: ENDOWRIST